# Patient Record
Sex: MALE | Race: WHITE | Employment: FULL TIME | URBAN - METROPOLITAN AREA
[De-identification: names, ages, dates, MRNs, and addresses within clinical notes are randomized per-mention and may not be internally consistent; named-entity substitution may affect disease eponyms.]

---

## 2017-04-21 ENCOUNTER — OFFICE VISIT (OUTPATIENT)
Dept: FAMILY MEDICINE | Facility: CLINIC | Age: 37
End: 2017-04-21

## 2017-04-21 VITALS
DIASTOLIC BLOOD PRESSURE: 70 MMHG | HEART RATE: 51 BPM | SYSTOLIC BLOOD PRESSURE: 118 MMHG | BODY MASS INDEX: 27.99 KG/M2 | OXYGEN SATURATION: 98 % | HEIGHT: 69 IN | WEIGHT: 189 LBS | TEMPERATURE: 97.8 F

## 2017-04-21 DIAGNOSIS — Z00.00 ROUTINE GENERAL MEDICAL EXAMINATION AT A HEALTH CARE FACILITY: Primary | ICD-10-CM

## 2017-04-21 DIAGNOSIS — Z23 NEED FOR TDAP VACCINATION: ICD-10-CM

## 2017-04-21 DIAGNOSIS — Z13.220 SCREENING FOR HYPERLIPIDEMIA: ICD-10-CM

## 2017-04-21 DIAGNOSIS — Z13.1 SCREENING FOR DIABETES MELLITUS: ICD-10-CM

## 2017-04-21 PROCEDURE — 99385 PREV VISIT NEW AGE 18-39: CPT | Mod: 25 | Performed by: FAMILY MEDICINE

## 2017-04-21 PROCEDURE — 90715 TDAP VACCINE 7 YRS/> IM: CPT | Performed by: FAMILY MEDICINE

## 2017-04-21 PROCEDURE — 82947 ASSAY GLUCOSE BLOOD QUANT: CPT | Mod: 90 | Performed by: FAMILY MEDICINE

## 2017-04-21 PROCEDURE — 80061 LIPID PANEL: CPT | Mod: 90 | Performed by: FAMILY MEDICINE

## 2017-04-21 PROCEDURE — 36415 COLL VENOUS BLD VENIPUNCTURE: CPT | Performed by: FAMILY MEDICINE

## 2017-04-21 RX ORDER — MULTIVIT WITH MINERALS/LUTEIN
1 TABLET ORAL DAILY
Qty: 30 TABLET | COMMUNITY
Start: 2017-04-21 | End: 2019-08-07

## 2017-04-21 NOTE — PROGRESS NOTES
SUBJECTIVE:     CC: Calvin Lopez is an 36 year old male who presents for preventative health visit.     Healthy Habits:    Do you get at least three servings of calcium containing foods daily (dairy, green leafy vegetables, etc.)? yes    Amount of exercise or daily activities, outside of work: 5 day(s) per week    Problems taking medications regularly not applicable    Medication side effects: No    Have you had an eye exam in the past two years? yes    Do you see a dentist twice per year? no    Do you have sleep apnea, excessive snoring or daytime drowsiness?no        NO CONCERNS    Today's PHQ-2 Score: 0  PHQ-2 ( 1999 Pfizer) 4/21/2017   Q1: Little interest or pleasure in doing things 0   Q2: Feeling down, depressed or hopeless 0   PHQ-2 Score 0       Abuse: Current or Past(Physical, Sexual or Emotional)- No  Do you feel safe in your environment - Yes    Social History   Substance Use Topics     Smoking status: Never Smoker     Smokeless tobacco: Not on file     Alcohol use 3.0 oz/week     5 Standard drinks or equivalent per week     The patient does not drink >3 drinks per day nor >7 drinks per week.        ROS:  C: NEGATIVE for fever, chills, change in weight  I: NEGATIVE for worrisome rashes, moles or lesions  E: NEGATIVE for vision changes or irritation  ENT: NEGATIVE for ear, mouth and throat problems  R: NEGATIVE for significant cough or SOB  CV: NEGATIVE for chest pain, palpitations or peripheral edema  GI: NEGATIVE for nausea, abdominal pain, heartburn, or change in bowel habits   male: negative for dysuria, hematuria, decreased urinary stream, erectile dysfunction, urethral discharge  M: NEGATIVE for significant arthralgias or myalgia  N: NEGATIVE for weakness, dizziness or paresthesias  P: NEGATIVE for changes in mood or affect    There is no problem list on file for this patient.    Past Surgical History:   Procedure Laterality Date     TONSILLECTOMY & ADENOIDECTOMY         Social History  "  Substance Use Topics     Smoking status: Never Smoker     Smokeless tobacco: Not on file     Alcohol use 3.0 oz/week     5 Standard drinks or equivalent per week     Family History   Problem Relation Age of Onset     Hyperlipidemia Father          Current Outpatient Prescriptions   Medication Sig Dispense Refill     Multiple Vitamins-Minerals (CENTRUM SILVER) per tablet Take 1 tablet by mouth daily 30 tablet      NO ACTIVE MEDICATIONS .       OBJECTIVE:     /70  Pulse 51  Temp 97.8  F (36.6  C)  Ht 1.759 m (5' 9.25\")  Wt 85.7 kg (189 lb)  SpO2 98%  BMI 27.71 kg/m2  EXAM:  GENERAL: healthy, alert and no distress  EYES: Eyes grossly normal to inspection, PERRL and conjunctivae and sclerae normal  HENT: ear canals and TM's normal, nose and mouth without ulcers or lesions  NECK: no adenopathy, no asymmetry, masses, or scars and thyroid normal to palpation  RESP: lungs clear to auscultation - no rales, rhonchi or wheezes  CV: regular rate and rhythm, normal S1 S2, no S3 or S4, no murmur, click or rub, no peripheral edema and peripheral pulses strong  ABDOMEN: soft, nontender, no hepatosplenomegaly, no masses and bowel sounds normal  MS: no gross musculoskeletal defects noted, no edema  SKIN: no suspicious lesions or rashes  NEURO: Normal strength and tone, mentation intact and speech normal  PSYCH: mentation appears normal, affect normal/bright    ASSESSMENT/PLAN:         ICD-10-CM    1. Routine general medical examination at a health care facility Z00.00    2. Screening for diabetes mellitus Z13.1 Glucose  Serum ([facility])   3. Screening for hyperlipidemia Z13.220 Lipid Panel ([facility])   4. Need for Tdap vaccination Z23 TDAP VACCINE ([facility])       COUNSELING:  Reviewed preventive health counseling, as reflected in patient instructions       Regular exercise       Healthy diet/nutrition       reports that he has never smoked. He does not have any smokeless tobacco history on file.    Estimated body mass " "index is 27.71 kg/(m^2) as calculated from the following:    Height as of this encounter: 1.759 m (5' 9.25\").    Weight as of this encounter: 85.7 kg (189 lb).       Counseling Resources:  ATP IV Guidelines  Pooled Cohorts Equation Calculator  FRAX Risk Assessment  ICSI Preventive Guidelines  Dietary Guidelines for Americans, 2010  USDA's MyPlate  ASA Prophylaxis  Lung CA Screening    Shady Yoon MD  [facility]  "

## 2017-04-21 NOTE — MR AVS SNAPSHOT
After Visit Summary   4/21/2017    Calvin Lopez    MRN: [id number]           Patient Information     Date Of Birth          1980        Visit Information        Provider Department      4/21/2017 7:45 AM Shady Yoon MD [facility]        Today's Diagnoses     Routine general medical examination at a health care facility    -  1    Screening for diabetes mellitus        Screening for hyperlipidemia        Need for Tdap vaccination          Care Instructions      Preventive Health Recommendations  Male Ages 26 - 39    Yearly exam:             See your health care provider every year in order to  o   Review health changes.   o   Discuss preventive care.    o   Review your medicines if your doctor has prescribed any.    You should be tested each year for STDs (sexually transmitted diseases), if you re at risk.     After age 35, talk to your provider about cholesterol testing. If you are at risk for heart disease, have your cholesterol tested at least every 5 years.     If you are at risk for diabetes, you should have a diabetes test (fasting glucose).  Shots: Get a flu shot each year. Get a tetanus shot every 10 years.     Nutrition:    Eat at least 5 servings of fruits and vegetables daily.     Eat whole-grain bread, whole-wheat pasta and brown rice instead of white grains and rice.     Talk to your provider about Calcium and Vitamin D.     Lifestyle    Exercise for at least 150 minutes a week (30 minutes a day, 5 days a week). This will help you control your weight and prevent disease.     Limit alcohol to one drink per day.     No smoking.     Wear sunscreen to prevent skin cancer.     See your dentist every six months for an exam and cleaning.           Follow-ups after your visit        Who to contact     If you have questions or need follow up information about today's clinic visit or your schedule please contact [facility] directly at [phone].  Normal or  "non-critical lab and imaging results will be communicated to you by MyChart, letter or phone within 4 business days after the clinic has received the results. If you do not hear from us within 7 days, please contact the clinic through [employer] or phone. If you have a critical or abnormal lab result, we will notify you by phone as soon as possible.  Submit refill requests through "[employer] or call your pharmacy and they will forward the refill request to us. Please allow 3 business days for your refill to be completed.          Additional Information About Your Visit        "[employer] Information     "[employer] lets you send messages to your doctor, view your test results, renew your prescriptions, schedule appointments and more. To sign up, go to [email or web address] . Click on \"Log in\" on the left side of the screen, which will take you to the Welcome page. Then click on \"Sign up Now\" on the right side of the page.     You will be asked to enter the access code listed below, as well as some personal information. Please follow the directions to create your username and password.     Your access code is: [email or web address]  Expires: 2017 11:17 AM     Your access code will  in 90 days. If you need help or a new code, please call your [place] clinic or [phone].        Care EveryWhere ID     This is your Care EveryWhere ID. This could be used by other organizations to access your [place] medical records  [id number]        Your Vitals Were     Pulse Temperature Height Pulse Oximetry BMI (Body Mass Index)       51 97.8  F (36.6  C) 1.759 m (5' 9.25\") 98% 27.71 kg/m2        Blood Pressure from Last 3 Encounters:   17 118/70   11 128/68   08 100/60    Weight from Last 3 Encounters:   17 85.7 kg (189 lb)   08 86.4 kg (190 lb 6.4 oz)   07 87.6 kg (193 lb 3.2 oz)              We Performed the Following     Glucose  Serum ([facility])     Lipid Panel ([facility])     TDAP VACCINE ([facility]) "        Primary Care Provider Office Phone # Fax #    Shady Yoon [phone]       [facility] [address]  [facility] [address]        Thank you!     Thank you for choosing [facility]  for your care. Our goal is always to provide you with excellent care. Hearing back from our patients is one way we can continue to improve our services. Please take a few minutes to complete the written survey that you may receive in the mail after your visit with us. Thank you!             Your Updated Medication List - Protect others around you: Learn how to safely use, store and throw away your medicines at www.disposemymeds.org.          This list is accurate as of: 4/21/17 11:17 AM.  Always use your most recent med list.                   Brand Name Dispense Instructions for use    CENTRUM SILVER per tablet     30 tablet    Take 1 tablet by mouth daily       NO ACTIVE MEDICATIONS      .

## 2017-04-22 LAB
CHOLEST SERPL-MCNC: 166 MG/DL (ref 100–199)
GLUCOSE SERPL-MCNC: 91 MG/DL (ref 65–99)
HDLC SERPL-MCNC: 47 MG/DL
LDL/HDL RATIO: 2.1 RATIO UNITS (ref 0–3.6)
LDLC SERPL CALC-MCNC: 99 MG/DL (ref 0–99)
TRIGL SERPL-MCNC: 98 MG/DL (ref 0–149)
VLDLC SERPL CALC-MCNC: 20 MG/DL (ref 5–40)

## 2017-08-21 ENCOUNTER — OFFICE VISIT (OUTPATIENT)
Dept: FAMILY MEDICINE | Facility: CLINIC | Age: 37
End: 2017-08-21

## 2017-08-21 ENCOUNTER — TRANSFERRED RECORDS (OUTPATIENT)
Dept: FAMILY MEDICINE | Facility: CLINIC | Age: 37
End: 2017-08-21

## 2017-08-21 VITALS
TEMPERATURE: 97.7 F | SYSTOLIC BLOOD PRESSURE: 102 MMHG | HEART RATE: 72 BPM | WEIGHT: 189 LBS | DIASTOLIC BLOOD PRESSURE: 80 MMHG | HEIGHT: 69 IN | OXYGEN SATURATION: 98 % | BODY MASS INDEX: 27.99 KG/M2

## 2017-08-21 DIAGNOSIS — R10.31 ABDOMINAL PAIN, RIGHT LOWER QUADRANT: Primary | ICD-10-CM

## 2017-08-21 DIAGNOSIS — R19.7 DIARRHEA, UNSPECIFIED TYPE: ICD-10-CM

## 2017-08-21 LAB
% GRANULOCYTES: 75.2 % (ref 42.2–75.2)
HCT VFR BLD AUTO: 45.9 % (ref 39–51)
HEMOGLOBIN: 15.1 G/DL (ref 13.4–17.5)
LYMPHOCYTES NFR BLD AUTO: 18.3 % (ref 20.5–51.1)
MCH RBC QN AUTO: 30.5 PG (ref 27–31)
MCHC RBC AUTO-ENTMCNC: 32.8 G/DL (ref 33–37)
MCV RBC AUTO: 92.9 FL (ref 80–100)
MONOCYTES NFR BLD AUTO: 6.5 % (ref 1.7–9.3)
PLATELET # BLD AUTO: 216 K/UL (ref 140–450)
RBC # BLD AUTO: 4.94 X10/CMM (ref 4.2–5.9)
WBC # BLD AUTO: 7.2 X10/CMM (ref 3.8–11)

## 2017-08-21 PROCEDURE — 85025 COMPLETE CBC W/AUTO DIFF WBC: CPT | Performed by: FAMILY MEDICINE

## 2017-08-21 PROCEDURE — 87177 OVA AND PARASITES SMEARS: CPT | Mod: 90 | Performed by: FAMILY MEDICINE

## 2017-08-21 PROCEDURE — 99214 OFFICE O/P EST MOD 30 MIN: CPT | Performed by: FAMILY MEDICINE

## 2017-08-21 PROCEDURE — 87209 SMEAR COMPLEX STAIN: CPT | Mod: 90 | Performed by: FAMILY MEDICINE

## 2017-08-21 PROCEDURE — 36415 COLL VENOUS BLD VENIPUNCTURE: CPT | Performed by: FAMILY MEDICINE

## 2017-08-21 PROCEDURE — 87324 CLOSTRIDIUM AG IA: CPT | Mod: 90 | Performed by: FAMILY MEDICINE

## 2017-08-21 PROCEDURE — 87046 STOOL CULTR AEROBIC BACT EA: CPT | Mod: 90 | Performed by: FAMILY MEDICINE

## 2017-08-21 PROCEDURE — 87427 SHIGA-LIKE TOXIN AG IA: CPT | Mod: 90 | Performed by: FAMILY MEDICINE

## 2017-08-21 PROCEDURE — 87045 FECES CULTURE AEROBIC BACT: CPT | Mod: 90 | Performed by: FAMILY MEDICINE

## 2017-08-21 NOTE — PROGRESS NOTES
Diarrhea -     Onset: 2 week(s) ago    Description:   Consistency of stool: watery, explosive and getting worse  Blood in stool: no  Number of loose stools in past 24 hours: 4    History:          Ill contacts: no        Recent use of antibiotics: no                 Recent medication-new or changes(Rx or OTC): no        Recent travels: YES- but domestic and no obvious bad          Have any tests/studies been done: none    Accompanying Signs & Symptoms:   Fever: YES- 100.2  Nausea or vomiting; no  Abdominal pain: YES- crampy pain  Episodes of constipation: no  Weight loss: no  Family history of Colon Cancer: no  Therapies tried: Imodium AD with minor relief    Problem(s) Oriented visit      ROS:  General and CV completed and negative except as noted above     HISTORY:   reports that he drinks about 3.0 oz of alcohol per week    reports that he has never smoked. He does not have any smokeless tobacco history on file.    Past Medical History:   Diagnosis Date     NO ACTIVE PROBLEMS      Past Surgical History:   Procedure Laterality Date     TONSILLECTOMY & ADENOIDECTOMY         EXAM:  BP: 102/80   Pulse: 72    Temp: 97.7    Wt Readings from Last 2 Encounters:   08/21/17 85.7 kg (189 lb)   04/21/17 85.7 kg (189 lb)       BMI= Body mass index is 27.91 kg/(m^2).    EXAM:  APPEARANCE: = Relaxed and in no distress  Conj/Eyelids = noninjected and lids and lashes are without inflammation  PERRLA/Irises = Pupils Round Reactive to Light and Irisis without inflammation  Neck = No anterior or posterior adenopathy appreciated.  Thyroid = Not enlarged and no masses felt  Resp effort = Calm regular breathing  Breath Sounds = Good air movement with no rales or rhonchi in any lung fields  Heart Rate, Rythym, & sounds (no Murm)  = Regular rate and rythym with no S3, S4, or murmer appreciated.  Carotid Art's = Pulses full and equal and no bruits appreciated  Abdomen: soft, nontender, no masses, & bowel sounds = RLQ pain with deep  "palpation question of rebound  Liver/Spleen = Normal span and no splenomegaly noted  Digits and Nails = FROM in all finger joints, no nail dystrophy  Ext (edema) = No pretibial edema noted or elsewhere  Musculsktl =  Strength and ROM of major joints are within normal limits  SKIN = absent significant rashes or lesions   Recent/Remote Memory = Alert and Oriented x 3  Mood/Affect = Cooperative and interested      Assessment/Plan:  Calvin was seen today for diarrhea and fever.    Diagnoses and all orders for this visit:    Abdominal pain, right lower quadrant  -     CBC with Diff/Plt ([facility])  -     Referral to [facility]    Diarrhea, unspecified type  -     C difficile Toxins A+B  EIA ([facility]); Future  -     Ova + Parasite Exam ([facility]); Future  -     Stool Culture ([facility]); Future        COUNSELING:   reports that he has never smoked. He does not have any smokeless tobacco history on file.    Estimated body mass index is 27.91 kg/(m^2) as calculated from the following:    Height as of this encounter: 1.753 m (5' 9\").    Weight as of this encounter: 85.7 kg (189 lb).         Appropriate preventive services were discussed with this patient, including applicable screening as appropriate for cardiovascular disease, diabetes, osteopenia/osteoporosis, and glaucoma.  As appropriate for age/gender, discussed screening for colorectal cancer, prostate cancer, breast cancer, and cervical cancer. Checklist reviewing preventive services available has been given to the patient.    Reviewed patients plan of care and provided an AVS. The Basic Care Plan (routine screening as documented in Health Maintenance) for Calvin meets the Care Plan requirement. This Care Plan has been established and reviewed with the  Patient.      The following health maintenance items are reviewed in Epic and correct as of today:Health Maintenance   Topic Date Due     INFLUENZA VACCINE (SYSTEM ASSIGNED)  09/01/2017     LIPID SCREEN Q5 YR MALE (SYSTEM " ASSIGNED)  04/21/2022     TETANUS IMMUNIZATION (SYSTEM ASSIGNED)  04/21/2027       Dominic Mondragon  [facility]  For any issues my office # is [phone]

## 2017-08-21 NOTE — MR AVS SNAPSHOT
After Visit Summary   8/21/2017    Calvin Lopez    MRN: [id number]           Patient Information     Date Of Birth          1980        Visit Information        Provider Department      8/21/2017 10:45 AM Dominic Mondragon MD [facility]        Today's Diagnoses     Abdominal pain, right lower quadrant    -  1    Diarrhea, unspecified type           Follow-ups after your visit        Additional Services     Referral to [facility]       Referral to [facility]  Phone: [phone]  Fax: [phone]  Reason for referral: CT of abd/pelvis  rlq pain                  Future tests that were ordered for you today     Open Future Orders        Priority Expected Expires Ordered    C difficile Toxins A+B  EIA ([facility]) Routine  9/21/2017 8/21/2017    Ova + Parasite Exam ([facility]) Routine  9/21/2017 8/21/2017    Stool Culture ([facility]) Routine  9/21/2017 8/21/2017            Who to contact     If you have questions or need follow up information about today's clinic visit or your schedule please contact [facility] directly at [phone].  Normal or non-critical lab and imaging results will be communicated to you by [employer], letter or phone within 4 business days after the clinic has received the results. If you do not hear from us within 7 days, please contact the clinic through [employer] or phone. If you have a critical or abnormal lab result, we will notify you by phone as soon as possible.  Submit refill requests through [employer] or call your pharmacy and they will forward the refill request to us. Please allow 3 business days for your refill to be completed.          Additional Information About Your Visit        [employer] Information     [employer] gives you secure access to your electronic health record. If you see a primary care provider, you can also send messages to your care team and make appointments. If you have questions, please call your primary care clinic.   "If you do not have a primary care provider, please call [phone] and they will assist you.        Care EveryWhere ID     This is your Care EveryWhere ID. This could be used by other organizations to access your [place] medical records  [id number]        Your Vitals Were     Pulse Temperature Height Pulse Oximetry BMI (Body Mass Index)       72 97.7  F (36.5  C) 1.753 m (5' 9\") 98% 27.91 kg/m2        Blood Pressure from Last 3 Encounters:   08/21/17 102/80   04/21/17 118/70   01/26/11 128/68    Weight from Last 3 Encounters:   08/21/17 85.7 kg (189 lb)   04/21/17 85.7 kg (189 lb)   03/13/08 86.4 kg (190 lb 6.4 oz)              We Performed the Following     CBC with Diff/Plt ([facility])     Referral to [facility]        Primary Care Provider Office Phone # Fax #    Shady Yoon [phone] [address] [facility] [address]        Equal Access to Services     [facility]: Hadii aad ku hadasho Soomaali, waaxda luqadaha, qaybta kaalmada cliff, deborah white . So [facility] [phone].    ATENCIÓN: Si habla español, tiene a quintanilla disposición servicios gratuitos de asistencia lingüística. [facility] [phone].    We comply with applicable federal civil rights laws and [place] laws. We do not discriminate on the basis of race, color, national origin, age, disability sex, sexual orientation or gender identity.            Thank you!     Thank you for choosing [facility]  for your care. Our goal is always to provide you with excellent care. Hearing back from our patients is one way we can continue to improve our services. Please take a few minutes to complete the written survey that you may receive in the mail after your visit with us. Thank you!             Your Updated Medication List - Protect others around you: Learn how to safely use, store and throw away your medicines at www.disposemymeds.org.          This list is accurate as of: " 8/21/17 11:46 AM.  Always use your most recent med list.                   Brand Name Dispense Instructions for use Diagnosis    CENTRUM SILVER per tablet     30 tablet    Take 1 tablet by mouth daily        NO ACTIVE MEDICATIONS      .

## 2017-08-22 LAB — CLOSTRIDIUM DIFFICILE TOXIN A AND B EIA-QUEST: NEGATIVE

## 2017-08-27 LAB
CAMPYLOBACTER CULTURE: NORMAL
E. COLI SHIGA TOXIN EIA STOOL: NEGATIVE
Lab: NORMAL
OVA + PARASITE EXAM: NORMAL
SALMONELLA/SHIGELLA SCREEN: NORMAL

## 2017-09-15 ENCOUNTER — MEDICAL CORRESPONDENCE (OUTPATIENT)
Dept: FAMILY MEDICINE | Facility: CLINIC | Age: 37
End: 2017-09-15

## 2019-08-07 ENCOUNTER — HOSPITAL ENCOUNTER (EMERGENCY)
Facility: CLINIC | Age: 39
Discharge: HOME | End: 2019-08-07
Admitting: PHYSICIAN ASSISTANT
Payer: COMMERCIAL

## 2019-08-07 VITALS
RESPIRATION RATE: 18 BRPM | OXYGEN SATURATION: 99 % | HEART RATE: 79 BPM | WEIGHT: 188 LBS | TEMPERATURE: 98.8 F | SYSTOLIC BLOOD PRESSURE: 161 MMHG | BODY MASS INDEX: 27.85 KG/M2 | HEIGHT: 69 IN | DIASTOLIC BLOOD PRESSURE: 92 MMHG

## 2019-08-07 DIAGNOSIS — J01.00 ACUTE NON-RECURRENT MAXILLARY SINUSITIS: ICD-10-CM

## 2019-08-07 PROCEDURE — 99282 EMERGENCY DEPT VISIT SF MDM: CPT

## 2019-08-07 RX ORDER — AZITHROMYCIN 250 MG/1
TABLET, FILM COATED ORAL
COMMUNITY
Start: 2019-08-05 | End: 2019-08-11

## 2019-08-07 RX ORDER — DOXYCYCLINE 100 MG/1
100 CAPSULE ORAL 2 TIMES DAILY
Qty: 14 CAPSULE | Refills: 0 | Status: ON HOLD | OUTPATIENT
Start: 2019-08-07 | End: 2019-08-14

## 2019-08-07 ASSESSMENT — MIFFLIN-ST. JEOR: SCORE: 1758.14

## 2019-08-07 ASSESSMENT — ENCOUNTER SYMPTOMS
NAUSEA: 1
SINUS PRESSURE: 1
SORE THROAT: 1
VOMITING: 0
FEVER: 1
HEADACHES: 1
COUGH: 0
ABDOMINAL PAIN: 0

## 2019-08-07 NOTE — ED PROVIDER NOTES
"  History     Chief Complaint:  Sinus pressure and fever    HPI   Calvin Lopez is a 38 year old male with a history of frequent sinus infections, who presents with sinus pressure for 1 week and fever for the past 5 days. The patient reports a highest measured fever of 103.6 F five days ago. To note, the patient went to Urgent care 2 days ago where he was prescribed Zithromax. He has currently taken 3 days worth of doses, with minimal relief. He endorses ear pain, sinus pressure, sore throat, nausea, and head pressure. He describes that upon onset of fever, he has had a fever of around 102 F each day, usually at night. He notes using a neti pot, saline spray, and sudafed as well. He reports recently being around kids 5 days ago and that his daughter has also complained of a sore throat 1 day ago. He notes that he gets sinus infections 2-3x yearly. He denies cough, abdominal pain, or vomiting.     Allergies:  Penicillins      Medications:    Zithromax     Past Medical History:    Sinus infection     Past Surgical History:    T&A    Family History:    Hyperlipidemia     Social History:  Smoking status: no  Alcohol use: yes  Drug use: no  PCP: [facility]  Marital Status:        Review of Systems   Constitutional: Positive for fever.   HENT: Positive for congestion, ear pain, sinus pressure and sore throat.    Respiratory: Negative for cough.    Gastrointestinal: Positive for nausea. Negative for abdominal pain and vomiting.   Neurological: Positive for headaches (Head pressure).   All other systems reviewed and are negative.    Physical Exam     Patient Vitals for the past 24 hrs:   BP Temp Temp src Pulse Resp SpO2 Height Weight   08/07/19 1508 (!) 161/92 98.8  F (37.1  C) Oral 79 18 99 % 1.753 m (5' 9\") 85.3 kg (188 lb)      Physical Exam  General: Alert and interactive. Appears well. Cooperative and pleasant.   Eyes: The pupils are equal and round. EOMs intact. No scleral icterus.  ENT: No " abnormalities to the external nose or ears. Mucous membranes moist. Posterior oropharynx is non-erythematous. Tenderness to percussion of the maxillary and frontal sinuses, left greater than right. TMs clear bilaterally.    Neck: Trachea is in the midline. No nuchal rigidity.     CV: Regular rate and rhythm. S1 and S2 normal without murmur, click, gallop or rub.   Resp: Breath sounds are clear bilaterally, without rhonchi, wheezes, rales. Non-labored, no retractions or accessory muscle use.     GI: Abdomen is soft without distension. No tenderness to palpation. No peritoneal signs.    MS: Moving all extremities well. Good muscle tone.   Skin: Warm and dry. No rash or lesions noted.  Neuro: Alert and oriented x 3. No focal neurologic deficits. Good strength and sensation in upper and lower extremities.    Psych: Awake. Alert.  Normal affect. Appropriate interactions.  Lymph: No anterior or posterior cervical lymphadenopathy noted.    Emergency Department Course     Emergency Department Course:  1519: Nursing notes and vitals reviewed. I performed an exam of the patient as documented above.     Findings and plan explained to the Patient. Patient discharged home with instructions regarding supportive care, medications, and reasons to return. The importance of close follow-up was reviewed. The patient was prescribed Vibramycin.     I personally answered all related questions prior to discharge.     Impression & Plan    Medical Decision Making:  Calvin Lopez is a 38 year old male who presents for evaluation of facial pain and congestion. Signs and symptoms are consistent with sinusitis. He gets these often and would like referral to ENT for consultation. He is on Azithromycin, seeing no improvement, likely because it is the wrong antibiotic. He will continue Netti pot, steam showers, Mucinex/Sudafed, and will begin Doxycycline given fevers and tenderness to percussion of maxillary sinuses.  Doubt fungal sinusitis,  meningitis, encephalitis, cavernous sinus thrombosis, ocular pathology, intracerebral bleed, serious bacterial infection otherwise. ENT information provided. Stable for discharge at this time.     Diagnosis:    ICD-10-CM    1. Acute non-recurrent maxillary sinusitis J01.00      Disposition:  discharged to home    Discharge Medications:     Medication List      Started    doxycycline hyclate 100 MG capsule  Commonly known as:  VIBRAMYCIN  100 mg, Oral, 2 TIMES DAILY            IKymberly, am serving as a scribe at 3:19 PM on 8/7/2019 to document services personally performed by Danni Kraus PA-C based on my observations and the provider's statements to me.       Kymberly Hernandez  8/7/2019    EMERGENCY DEPARTMENT       Danni Kraus PA-C  08/07/19 1800

## 2019-08-07 NOTE — ED AVS SNAPSHOT
Emergency Department  [address] [place] [address]  Phone:  [phone]  Fax:  [phone]                                    Calvin Lopez   MRN: [id number]    Department:   Emergency Department   Date of Visit:  8/7/2019           After Visit Summary Signature Page    I have received my discharge instructions, and my questions have been answered. I have discussed any challenges I see with this plan with the nurse or doctor.    ..........................................................................................................................................  Patient/Patient Representative Signature      ..........................................................................................................................................  Patient Representative Print Name and Relationship to Patient    ..................................................               ................................................  Date                                   Time    ..........................................................................................................................................  Reviewed by Signature/Title    ...................................................              ..............................................  Date                                               Time          22EPIC Rev 08/18

## 2019-08-07 NOTE — DISCHARGE INSTRUCTIONS
Ear nose and throat specialty care's office is all over the [facility] area.  If you call their general office that should be able to connect you with an open appointment in the next week.\  Discharge Instructions  Sinus Infection    You have acute sinusitis, or an infection of the sinuses. The sinuses are the hollow areas within the facial bones that are connected to the nasal opening. The most common cause of acute sinusitis is a virus infection associated with the common cold. Bacterial sinusitis occurs much less commonly, usually as a complication of viral sinusitis. Experts say that most sinusitis is caused by a virus within the first 7-10 days of illness. Antibiotics do nothing to help with virus infections, so most people do not need antibiotics for acute sinusitis.     Generally, every Emergency Department visit should have a follow-up clinic visit with either a primary or a specialty clinic/provider. Please follow-up as instructed by your emergency provider today.    Return to the Emergency Department if:  Your vision changes.  You are confused or have difficulty thinking clearly.  You have swelling around your eye.  You develop a severe headache or neck stiffness.  Your symptoms get worse and you are unable to see your primary provider.      Treatment:  Pain relief -- Non-prescription pain medications, such as Tylenol  (acetaminophen) or Motrin  or Advil  (ibuprofen) are recommended for pain.  Do not use a medicine that you are allergic to, or if your provider has told you not to use it.     Nasal irrigation -- Flushing the nose and sinuses with a saline solution several times per day can help to decrease pain caused by congestion.  Nasal decongestants -- Nasal decongestant sprays, including Afrin  (oxymetazoline) and Jonathan-Synephrine  (phenylephrine) can be used to temporarily treat congestion. However, these sprays should not be used for more than two to three days due to the risk of rebound congestion (when  the nose is congested constantly unless the medication is used repeatedly).  Nasal glucocorticoids -- These are prescription steroids delivered by a nasal spray that can help to reduce swelling inside the nose, usually within two to three days. These drugs have few side effects and dramatically relieve symptoms in most people.  If you use these in conjunction with Afrin  you will need to use at least 15 minutes prior to the nasal decongestant.    Antibiotic? -- Rarely antibiotics are used along with the above treatments.    If you were given a prescription for medicine here today, be sure to read all of the information (including the package insert) that comes with your prescription.  This will include important information about the medicine, its side effects, and any warnings that you need to know about.  The pharmacist who fills the prescription can provide more information and answer questions you may have about the medicine.  If you have questions or concerns that the pharmacist cannot address, please call or return to the Emergency Department.   Remember that you can always come back to the Emergency Department if you are not able to see your regular provider in the amount of time listed above, if you get any new symptoms, or if there is anything that worries you.

## 2019-08-11 ENCOUNTER — HOSPITAL ENCOUNTER (INPATIENT)
Facility: CLINIC | Age: 39
LOS: 3 days | Discharge: HOME | DRG: 690 | End: 2019-08-14
Attending: EMERGENCY MEDICINE | Admitting: INTERNAL MEDICINE
Payer: COMMERCIAL

## 2019-08-11 ENCOUNTER — APPOINTMENT (OUTPATIENT)
Dept: CT IMAGING | Facility: CLINIC | Age: 39
DRG: 690 | End: 2019-08-11
Attending: EMERGENCY MEDICINE
Payer: COMMERCIAL

## 2019-08-11 ENCOUNTER — APPOINTMENT (OUTPATIENT)
Dept: ULTRASOUND IMAGING | Facility: CLINIC | Age: 39
DRG: 690 | End: 2019-08-11
Attending: EMERGENCY MEDICINE
Payer: COMMERCIAL

## 2019-08-11 DIAGNOSIS — N10 PYELONEPHRITIS, ACUTE: Primary | ICD-10-CM

## 2019-08-11 DIAGNOSIS — J32.9 SINUSITIS, UNSPECIFIED CHRONICITY, UNSPECIFIED LOCATION: ICD-10-CM

## 2019-08-11 PROBLEM — N12 PYELONEPHRITIS: Status: ACTIVE | Noted: 2019-08-11

## 2019-08-11 LAB
ALBUMIN SERPL-MCNC: 3.1 G/DL (ref 3.4–5)
ALBUMIN UR-MCNC: 10 MG/DL
ALP SERPL-CCNC: 58 U/L (ref 40–150)
ALT SERPL W P-5'-P-CCNC: 23 U/L (ref 0–70)
ANION GAP SERPL CALCULATED.3IONS-SCNC: 7 MMOL/L (ref 3–14)
APPEARANCE UR: ABNORMAL
AST SERPL W P-5'-P-CCNC: 15 U/L (ref 0–45)
BACTERIA #/AREA URNS HPF: ABNORMAL /HPF
BASOPHILS # BLD AUTO: 0 10E9/L (ref 0–0.2)
BASOPHILS NFR BLD AUTO: 0.2 %
BILIRUB SERPL-MCNC: 0.4 MG/DL (ref 0.2–1.3)
BILIRUB UR QL STRIP: NEGATIVE
BUN SERPL-MCNC: 13 MG/DL (ref 7–30)
CALCIUM SERPL-MCNC: 8.7 MG/DL (ref 8.5–10.1)
CHLORIDE SERPL-SCNC: 103 MMOL/L (ref 94–109)
CO2 BLDCOV-SCNC: 25 MMOL/L (ref 21–28)
CO2 SERPL-SCNC: 26 MMOL/L (ref 20–32)
COLOR UR AUTO: YELLOW
CREAT SERPL-MCNC: 0.92 MG/DL (ref 0.66–1.25)
DIFFERENTIAL METHOD BLD: ABNORMAL
EOSINOPHIL # BLD AUTO: 0 10E9/L (ref 0–0.7)
EOSINOPHIL NFR BLD AUTO: 0.3 %
ERYTHROCYTE [DISTWIDTH] IN BLOOD BY AUTOMATED COUNT: 11.8 % (ref 10–15)
GFR SERPL CREATININE-BSD FRML MDRD: >90 ML/MIN/{1.73_M2}
GLUCOSE SERPL-MCNC: 130 MG/DL (ref 70–99)
GLUCOSE UR STRIP-MCNC: NEGATIVE MG/DL
HCT VFR BLD AUTO: 40.6 % (ref 40–53)
HGB BLD-MCNC: 14.1 G/DL (ref 13.3–17.7)
HGB UR QL STRIP: ABNORMAL
IMM GRANULOCYTES # BLD: 0.1 10E9/L (ref 0–0.4)
IMM GRANULOCYTES NFR BLD: 1 %
KETONES UR STRIP-MCNC: NEGATIVE MG/DL
LACTATE BLD-SCNC: 0.9 MMOL/L (ref 0.7–2)
LACTATE BLD-SCNC: 1.4 MMOL/L (ref 0.7–2)
LACTATE BLD-SCNC: 2 MMOL/L (ref 0.7–2.1)
LEUKOCYTE ESTERASE UR QL STRIP: ABNORMAL
LIPASE SERPL-CCNC: 91 U/L (ref 73–393)
LYMPHOCYTES # BLD AUTO: 1.4 10E9/L (ref 0.8–5.3)
LYMPHOCYTES NFR BLD AUTO: 10.9 %
MCH RBC QN AUTO: 31.8 PG (ref 26.5–33)
MCHC RBC AUTO-ENTMCNC: 34.7 G/DL (ref 31.5–36.5)
MCV RBC AUTO: 92 FL (ref 78–100)
MONOCYTES # BLD AUTO: 1.3 10E9/L (ref 0–1.3)
MONOCYTES NFR BLD AUTO: 10.2 %
NEUTROPHILS # BLD AUTO: 9.7 10E9/L (ref 1.6–8.3)
NEUTROPHILS NFR BLD AUTO: 77.4 %
NITRATE UR QL: NEGATIVE
NRBC # BLD AUTO: 0 10*3/UL
NRBC BLD AUTO-RTO: 0 /100
PCO2 BLDV: 33 MM HG (ref 40–50)
PH BLDV: 7.49 PH (ref 7.32–7.43)
PH UR STRIP: 7.5 PH (ref 5–7)
PLATELET # BLD AUTO: 281 10E9/L (ref 150–450)
PO2 BLDV: 63 MM HG (ref 25–47)
POTASSIUM SERPL-SCNC: 4.2 MMOL/L (ref 3.4–5.3)
PROCALCITONIN SERPL-MCNC: 0.34 NG/ML
PROT SERPL-MCNC: 6.8 G/DL (ref 6.8–8.8)
RBC # BLD AUTO: 4.43 10E12/L (ref 4.4–5.9)
RBC #/AREA URNS AUTO: 3 /HPF (ref 0–2)
SAO2 % BLDV FROM PO2: 94 %
SODIUM SERPL-SCNC: 136 MMOL/L (ref 133–144)
SOURCE: ABNORMAL
SP GR UR STRIP: 1.01 (ref 1–1.03)
UROBILINOGEN UR STRIP-MCNC: NORMAL MG/DL (ref 0–2)
WBC # BLD AUTO: 12.5 10E9/L (ref 4–11)
WBC #/AREA URNS AUTO: 175 /HPF (ref 0–5)
WBC CLUMPS #/AREA URNS HPF: PRESENT /HPF

## 2019-08-11 PROCEDURE — 25800030 ZZH RX IP 258 OP 636: Performed by: EMERGENCY MEDICINE

## 2019-08-11 PROCEDURE — 87086 URINE CULTURE/COLONY COUNT: CPT | Performed by: EMERGENCY MEDICINE

## 2019-08-11 PROCEDURE — 25000132 ZZH RX MED GY IP 250 OP 250 PS 637: Performed by: EMERGENCY MEDICINE

## 2019-08-11 PROCEDURE — 25000128 H RX IP 250 OP 636: Performed by: EMERGENCY MEDICINE

## 2019-08-11 PROCEDURE — 87088 URINE BACTERIA CULTURE: CPT | Performed by: EMERGENCY MEDICINE

## 2019-08-11 PROCEDURE — 99285 EMERGENCY DEPT VISIT HI MDM: CPT | Mod: 25

## 2019-08-11 PROCEDURE — 84145 PROCALCITONIN (PCT): CPT | Performed by: PHYSICIAN ASSISTANT

## 2019-08-11 PROCEDURE — 96365 THER/PROPH/DIAG IV INF INIT: CPT

## 2019-08-11 PROCEDURE — G0378 HOSPITAL OBSERVATION PER HR: HCPCS

## 2019-08-11 PROCEDURE — 25000132 ZZH RX MED GY IP 250 OP 250 PS 637: Performed by: INTERNAL MEDICINE

## 2019-08-11 PROCEDURE — 12000000 ZZH R&B MED SURG/OB

## 2019-08-11 PROCEDURE — 76705 ECHO EXAM OF ABDOMEN: CPT

## 2019-08-11 PROCEDURE — 74176 CT ABD & PELVIS W/O CONTRAST: CPT

## 2019-08-11 PROCEDURE — 36415 COLL VENOUS BLD VENIPUNCTURE: CPT | Performed by: PHYSICIAN ASSISTANT

## 2019-08-11 PROCEDURE — 87186 SC STD MICRODIL/AGAR DIL: CPT | Performed by: EMERGENCY MEDICINE

## 2019-08-11 PROCEDURE — 80053 COMPREHEN METABOLIC PANEL: CPT | Performed by: EMERGENCY MEDICINE

## 2019-08-11 PROCEDURE — 85025 COMPLETE CBC W/AUTO DIFF WBC: CPT | Performed by: EMERGENCY MEDICINE

## 2019-08-11 PROCEDURE — 81001 URINALYSIS AUTO W/SCOPE: CPT | Performed by: EMERGENCY MEDICINE

## 2019-08-11 PROCEDURE — 99223 1ST HOSP IP/OBS HIGH 75: CPT | Mod: AI | Performed by: INTERNAL MEDICINE

## 2019-08-11 PROCEDURE — 36415 COLL VENOUS BLD VENIPUNCTURE: CPT | Performed by: INTERNAL MEDICINE

## 2019-08-11 PROCEDURE — 83690 ASSAY OF LIPASE: CPT | Performed by: EMERGENCY MEDICINE

## 2019-08-11 PROCEDURE — 83605 ASSAY OF LACTIC ACID: CPT

## 2019-08-11 PROCEDURE — 99207 ZZC CDG-CODE CATEGORY CHANGED: CPT | Performed by: INTERNAL MEDICINE

## 2019-08-11 PROCEDURE — 87040 BLOOD CULTURE FOR BACTERIA: CPT | Performed by: EMERGENCY MEDICINE

## 2019-08-11 PROCEDURE — 25000128 H RX IP 250 OP 636: Performed by: PHYSICIAN ASSISTANT

## 2019-08-11 PROCEDURE — 83605 ASSAY OF LACTIC ACID: CPT | Performed by: PHYSICIAN ASSISTANT

## 2019-08-11 PROCEDURE — 82803 BLOOD GASES ANY COMBINATION: CPT

## 2019-08-11 PROCEDURE — 83605 ASSAY OF LACTIC ACID: CPT | Performed by: INTERNAL MEDICINE

## 2019-08-11 PROCEDURE — 96361 HYDRATE IV INFUSION ADD-ON: CPT

## 2019-08-11 RX ORDER — PROCHLORPERAZINE MALEATE 10 MG
10 TABLET ORAL EVERY 6 HOURS PRN
Status: DISCONTINUED | OUTPATIENT
Start: 2019-08-11 | End: 2019-08-11

## 2019-08-11 RX ORDER — CEFTRIAXONE 1 G/1
1 INJECTION, POWDER, FOR SOLUTION INTRAMUSCULAR; INTRAVENOUS EVERY 24 HOURS
Status: DISCONTINUED | OUTPATIENT
Start: 2019-08-11 | End: 2019-08-11

## 2019-08-11 RX ORDER — NALOXONE HYDROCHLORIDE 0.4 MG/ML
.1-.4 INJECTION, SOLUTION INTRAMUSCULAR; INTRAVENOUS; SUBCUTANEOUS
Status: DISCONTINUED | OUTPATIENT
Start: 2019-08-11 | End: 2019-08-14 | Stop reason: HOSPADM

## 2019-08-11 RX ORDER — ONDANSETRON 4 MG/1
4 TABLET, ORALLY DISINTEGRATING ORAL EVERY 6 HOURS PRN
Status: DISCONTINUED | OUTPATIENT
Start: 2019-08-11 | End: 2019-08-14 | Stop reason: HOSPADM

## 2019-08-11 RX ORDER — PROCHLORPERAZINE 25 MG
25 SUPPOSITORY, RECTAL RECTAL EVERY 12 HOURS PRN
Status: DISCONTINUED | OUTPATIENT
Start: 2019-08-11 | End: 2019-08-11

## 2019-08-11 RX ORDER — ACETAMINOPHEN 650 MG/1
650 SUPPOSITORY RECTAL EVERY 4 HOURS PRN
Status: DISCONTINUED | OUTPATIENT
Start: 2019-08-11 | End: 2019-08-14 | Stop reason: HOSPADM

## 2019-08-11 RX ORDER — HYDROMORPHONE HYDROCHLORIDE 1 MG/ML
.3-.5 INJECTION, SOLUTION INTRAMUSCULAR; INTRAVENOUS; SUBCUTANEOUS EVERY 4 HOURS PRN
Status: DISCONTINUED | OUTPATIENT
Start: 2019-08-11 | End: 2019-08-14 | Stop reason: HOSPADM

## 2019-08-11 RX ORDER — MEROPENEM 1 G/1
1 INJECTION, POWDER, FOR SOLUTION INTRAVENOUS EVERY 8 HOURS
Status: DISCONTINUED | OUTPATIENT
Start: 2019-08-11 | End: 2019-08-14

## 2019-08-11 RX ORDER — AMOXICILLIN 250 MG
1 CAPSULE ORAL 2 TIMES DAILY PRN
Status: DISCONTINUED | OUTPATIENT
Start: 2019-08-11 | End: 2019-08-14 | Stop reason: HOSPADM

## 2019-08-11 RX ORDER — POLYETHYLENE GLYCOL 3350 17 G/17G
17 POWDER, FOR SOLUTION ORAL DAILY PRN
Status: DISCONTINUED | OUTPATIENT
Start: 2019-08-11 | End: 2019-08-11

## 2019-08-11 RX ORDER — AMOXICILLIN 250 MG
1 CAPSULE ORAL 2 TIMES DAILY PRN
Status: DISCONTINUED | OUTPATIENT
Start: 2019-08-11 | End: 2019-08-11

## 2019-08-11 RX ORDER — ACETAMINOPHEN 650 MG/1
650 SUPPOSITORY RECTAL EVERY 4 HOURS PRN
Status: DISCONTINUED | OUTPATIENT
Start: 2019-08-11 | End: 2019-08-11

## 2019-08-11 RX ORDER — ACETAMINOPHEN 325 MG/1
650 TABLET ORAL EVERY 4 HOURS PRN
Status: DISCONTINUED | OUTPATIENT
Start: 2019-08-11 | End: 2019-08-14 | Stop reason: HOSPADM

## 2019-08-11 RX ORDER — SODIUM CHLORIDE 9 MG/ML
1000 INJECTION, SOLUTION INTRAVENOUS CONTINUOUS
Status: DISCONTINUED | OUTPATIENT
Start: 2019-08-11 | End: 2019-08-11

## 2019-08-11 RX ORDER — IBUPROFEN 400 MG/1
400 TABLET, FILM COATED ORAL EVERY 6 HOURS PRN
Status: DISCONTINUED | OUTPATIENT
Start: 2019-08-11 | End: 2019-08-14 | Stop reason: HOSPADM

## 2019-08-11 RX ORDER — DOXYCYCLINE 100 MG/1
100 CAPSULE ORAL 2 TIMES DAILY
Status: DISCONTINUED | OUTPATIENT
Start: 2019-08-11 | End: 2019-08-12

## 2019-08-11 RX ORDER — ACETAMINOPHEN 325 MG/1
650 TABLET ORAL EVERY 4 HOURS PRN
Status: DISCONTINUED | OUTPATIENT
Start: 2019-08-11 | End: 2019-08-11

## 2019-08-11 RX ORDER — ONDANSETRON 2 MG/ML
4 INJECTION INTRAMUSCULAR; INTRAVENOUS EVERY 6 HOURS PRN
Status: DISCONTINUED | OUTPATIENT
Start: 2019-08-11 | End: 2019-08-11

## 2019-08-11 RX ORDER — SODIUM CHLORIDE 9 MG/ML
INJECTION, SOLUTION INTRAVENOUS CONTINUOUS
Status: DISCONTINUED | OUTPATIENT
Start: 2019-08-11 | End: 2019-08-14 | Stop reason: HOSPADM

## 2019-08-11 RX ORDER — HYDROMORPHONE HYDROCHLORIDE 1 MG/ML
.3-.5 INJECTION, SOLUTION INTRAMUSCULAR; INTRAVENOUS; SUBCUTANEOUS EVERY 4 HOURS PRN
Status: DISCONTINUED | OUTPATIENT
Start: 2019-08-11 | End: 2019-08-11

## 2019-08-11 RX ORDER — CEFTRIAXONE 1 G/1
1 INJECTION, POWDER, FOR SOLUTION INTRAMUSCULAR; INTRAVENOUS EVERY 24 HOURS
Status: DISCONTINUED | OUTPATIENT
Start: 2019-08-12 | End: 2019-08-11

## 2019-08-11 RX ORDER — SODIUM CHLORIDE 9 MG/ML
INJECTION, SOLUTION INTRAVENOUS CONTINUOUS
Status: DISCONTINUED | OUTPATIENT
Start: 2019-08-11 | End: 2019-08-11

## 2019-08-11 RX ORDER — NALOXONE HYDROCHLORIDE 0.4 MG/ML
.1-.4 INJECTION, SOLUTION INTRAMUSCULAR; INTRAVENOUS; SUBCUTANEOUS
Status: DISCONTINUED | OUTPATIENT
Start: 2019-08-11 | End: 2019-08-11

## 2019-08-11 RX ORDER — AMOXICILLIN 250 MG
2 CAPSULE ORAL 2 TIMES DAILY PRN
Status: DISCONTINUED | OUTPATIENT
Start: 2019-08-11 | End: 2019-08-11

## 2019-08-11 RX ORDER — POLYETHYLENE GLYCOL 3350 17 G/17G
17 POWDER, FOR SOLUTION ORAL DAILY PRN
Status: DISCONTINUED | OUTPATIENT
Start: 2019-08-11 | End: 2019-08-14 | Stop reason: HOSPADM

## 2019-08-11 RX ORDER — PROCHLORPERAZINE 25 MG
25 SUPPOSITORY, RECTAL RECTAL EVERY 12 HOURS PRN
Status: DISCONTINUED | OUTPATIENT
Start: 2019-08-11 | End: 2019-08-14 | Stop reason: HOSPADM

## 2019-08-11 RX ORDER — IBUPROFEN 600 MG/1
600 TABLET, FILM COATED ORAL ONCE
Status: COMPLETED | OUTPATIENT
Start: 2019-08-11 | End: 2019-08-11

## 2019-08-11 RX ORDER — ACETAMINOPHEN 500 MG
1000 TABLET ORAL ONCE
Status: COMPLETED | OUTPATIENT
Start: 2019-08-11 | End: 2019-08-11

## 2019-08-11 RX ORDER — ONDANSETRON 2 MG/ML
4 INJECTION INTRAMUSCULAR; INTRAVENOUS EVERY 6 HOURS PRN
Status: DISCONTINUED | OUTPATIENT
Start: 2019-08-11 | End: 2019-08-14 | Stop reason: HOSPADM

## 2019-08-11 RX ORDER — ONDANSETRON 4 MG/1
4 TABLET, ORALLY DISINTEGRATING ORAL EVERY 6 HOURS PRN
Status: DISCONTINUED | OUTPATIENT
Start: 2019-08-11 | End: 2019-08-11

## 2019-08-11 RX ORDER — PROCHLORPERAZINE MALEATE 5 MG
10 TABLET ORAL EVERY 6 HOURS PRN
Status: DISCONTINUED | OUTPATIENT
Start: 2019-08-11 | End: 2019-08-14 | Stop reason: HOSPADM

## 2019-08-11 RX ORDER — AMOXICILLIN 250 MG
2 CAPSULE ORAL 2 TIMES DAILY PRN
Status: DISCONTINUED | OUTPATIENT
Start: 2019-08-11 | End: 2019-08-14 | Stop reason: HOSPADM

## 2019-08-11 RX ORDER — CEFTRIAXONE 1 G/1
1 INJECTION, POWDER, FOR SOLUTION INTRAMUSCULAR; INTRAVENOUS ONCE
Status: COMPLETED | OUTPATIENT
Start: 2019-08-11 | End: 2019-08-11

## 2019-08-11 RX ADMIN — IBUPROFEN 600 MG: 600 TABLET ORAL at 12:48

## 2019-08-11 RX ADMIN — ACETAMINOPHEN 650 MG: 325 TABLET, FILM COATED ORAL at 16:46

## 2019-08-11 RX ADMIN — IBUPROFEN 400 MG: 400 TABLET ORAL at 19:55

## 2019-08-11 RX ADMIN — ACETAMINOPHEN 1000 MG: 500 TABLET, FILM COATED ORAL at 10:26

## 2019-08-11 RX ADMIN — SODIUM CHLORIDE 1000 ML: 9 INJECTION, SOLUTION INTRAVENOUS at 08:21

## 2019-08-11 RX ADMIN — SODIUM CHLORIDE 500 ML: 9 INJECTION, SOLUTION INTRAVENOUS at 22:47

## 2019-08-11 RX ADMIN — SODIUM CHLORIDE 1000 ML: 9 INJECTION, SOLUTION INTRAVENOUS at 09:57

## 2019-08-11 RX ADMIN — DOXYCYCLINE 100 MG: 100 CAPSULE ORAL at 19:56

## 2019-08-11 RX ADMIN — MEROPENEM 1 G: 1 INJECTION, POWDER, FOR SOLUTION INTRAVENOUS at 22:08

## 2019-08-11 RX ADMIN — CEFTRIAXONE SODIUM 1 G: 1 INJECTION, POWDER, FOR SOLUTION INTRAMUSCULAR; INTRAVENOUS at 10:26

## 2019-08-11 RX ADMIN — ACETAMINOPHEN 650 MG: 325 TABLET, FILM COATED ORAL at 22:14

## 2019-08-11 ASSESSMENT — MIFFLIN-ST. JEOR
SCORE: 1767.21
SCORE: 1783.38

## 2019-08-11 NOTE — ED PROVIDER NOTES
History     Chief Complaint:  Fever       HPI   Calvin Lopez is a 38 year old male with a history of recurrent sinusitis who presents with fever. The patient reports that eight days ago he developed sinus pressure and a fever of 103 F. He was seen at urgent Care on 8/5 for evaluation of this and was prescribed Zithromax for suspected sinus infection. He did not have improvement in his symptoms after three doses of this medication, so he went to the ED for evaluation. The patient was switched to Doxycycline at this time. The patient continues that he has been taking this medication as directed along with Advil but still has not had resolution of his symptoms. He reports that during the night last night he developed a fever of 103 F, shaking chills, and RUQ abdominal and right flank pain. His persistent fever and new abdominal pain concerned him, prompting him to come to the ED. Here, the patient reports that his abdominal pain continues. He also reports continued sinus pressure, although this has improved from initial onset, as well as generalized weakness and intermittent nonbloody diarrhea. The patient denies rash, nausea, and vomiting as well as known exposure to sick contacts or ticks. Additionally, he denies sore throat or cough.  He has slight urinary discomfort.    Of note, the patient had an abdominal CT done in 2017 for right lower abdominal pain, and this was unremarkable.       Allergies:  Penicillins      Medications:    Doxycycline    Past Medical History:    Sinus infection     Past Surgical History:    Tonsillectomy & Adenoidectomy     Family History:    The patient reports a paternal history of hyperlipidemia. The patient denies any other relevant family history.     Social History:  The patient is  and presents with his wife, Mackenzie. He reports alcohol use of five drinks per week and denies tobacco or drug use.      Review of Systems   All other systems reviewed and are  "negative.    Physical Exam   First Vitals:  Patient Vitals for the past 24 hrs:   BP Temp Temp src Pulse Heart Rate Resp SpO2 Height Weight   08/11/19 0929 127/67 100  F (37.8  C) -- 87 -- 16 -- -- --   08/11/19 0741 124/72 99.8  F (37.7  C) Oral -- 111 18 97 % 1.753 m (5' 9\") 86.2 kg (190 lb)       Physical Exam  General: Somewhat ill but nontoxic appearing male semirecumbent in room 21, wife at bedside  HENT: mucous membranes slightly dry, OP clear, TMs normal, moderate tenderness to percussion over frontal and maxillary sinuses  CV: tachycardic rate initially, regular rhythm, no LE edema  Resp: clear throughout, normal effort, no crackles or wheezing  GI: abdomen soft and mild RUQ tenderness with equivocal Boo's sign, no guarding, no palpable masses  MSK:   Thoracic spine: nontender, mild R CVAT  Lumbar spine: nontender  Pelvis stable.  : deferred  Skin: appropriately warm and dry, no erythema/ecchymosis/vesicles to back  Neuro: alert, clear speech, oriented, ambulatory  Psych: pleasant, cooperative    Emergency Department Course     Imaging:  Radiographic findings were communicated with the patient who voiced understanding of the findings.    US Abdomen Limited:  IMPRESSION:  Probable gallbladder polyps. No shadowing gallstones or bile duct dilatation. Probable nonobstructing stone left renal collecting system without hydronephrosis. Report per radiology.     CT Abdomen Pelvis w/o contrast:  IMPRESSION: 1. Mild stranding about the right renal pelvis possibly related to UTI or recently passed stone. No evidence of current urinary tract calculi or hydronephrosis. 2. Mild colonic constipation. No bowel obstruction, diverticulitis or appendicitis. Report per radiology.     Laboratory:  CBC: WBC 12.5 (high), o/w WNL (HGB 14.1, )  CMP: Glucose 130 (high), Albumin 3.1 (low), o/w WNL (Creatinine 0.92)  ISTAT gases Lactate POCT: pH venous 7.49 (high), PO2 venous 33 (low), PO2 venous 63 (high), o/w " WNL  Lipase: 91  UA: Slightly cloudy, yellow urine. Trace blood. Urine pH 7.5 (high). Protein Albumin 10. Large leukocyte esterase. WBC/ (high). RBC/HPF 3 (high). WBC clumps present. Moderate bacteria present, o/w WNL.     Blood culture: Pending x2  Urine culture: Pending     Interventions:  Normal Saline 1L IV injection x2  Tylenol 1000mg tablet PO   Rocephin 1g IV injection     Emergency Department Course:  Nursing notes and vitals reviewed. I performed an exam of the patient as documented above. Patient declines any pain medication.   IV inserted and blood drawn. The patient was placed on continuous cardiac monitoring and pulse oximetry.   8:21 AM The patient was given NLS IV, dosage as noted above.     9:36 AM US obtained.  Results as above.  Findings discussed with the patient.   9:57 AM The patient was given additional NLS IV, dosage as noted above.    10:00 AM Recheck.   10:26 AM The patient was given Tylenol PO and Rocephin IV, dosages as noted above.    11:31 AM CT scan obtained.  Results as above.  Findings discussed with the patient.   12:00 PM Discussed the case with Dr. Marte, on call for hospitalist services.   Findings and plan explained to the Patient and spouse who consents to admission. Discussed the patient with Dr. Marte, who will admit the patient for further monitoring, evaluation, and treatment.     Impression & Plan      Medical Decision Making:  Given the ultimate results of his evaluation here today, I think that the most worrisome acute process is right-sided pyelonephritis causing fevers.  Bloodstream infection is also possible given his shaking chills and blood cultures were sent prior to initiation of antibiotics.  This is now his third visit this week, and he has been on several oral antibiotics to treat possible bacterial sinusitis.  There are no neurologic findings or other features to suggest a more ominous facial or neurologic condition and I do not think he requires CNS  imaging.  However, these recent antibiotics to place him at risk for resistant organisms, and given his systemic symptoms and persistent fevers, I think that he should be hospitalized for close monitoring and IV antibiotics.  He and his wife readily agree with this plan.  He was admitted to the hospitalist service.  No signs of ureteral stone or other surgical emergency.    Diagnosis:    ICD-10-CM   1. Pyelonephritis, acute N10   2. Sinusitis, unspecified chronicity, unspecified location J32.9       Disposition:  Admitted to Dr. Marte.     IGriselda, am serving as a scribe at 7:51 AM on 8/11/2019 to document services personally performed by Luis Alberto Marcum, based on my observations and the provider's statements to me.      This record was created at least in part using electronic voice recognition software, so please excuse any typographical errors.        Luis Alberto Marcum MD  08/11/19 4974

## 2019-08-11 NOTE — PHARMACY-ADMISSION MEDICATION HISTORY
Admission medication history interview status for the 8/11/2019  admission is complete. See EPIC admission navigator for prior to admission medications     Medication history source reliability:Good    Actions taken by pharmacist (provider contacted, etc): Spoke with patient     Additional medication history information not noted on PTA med list :None    Medication reconciliation/reorder completed by provider prior to medication history? No    Time spent in this activity: 10 minutes    Prior to Admission medications    Medication Sig Last Dose Taking? Auth Provider   doxycycline hyclate (VIBRAMYCIN) 100 MG capsule Take 1 capsule (100 mg) by mouth 2 times daily for 7 days 8/11/2019 at AM Yes Danni Kraus PA-C

## 2019-08-11 NOTE — PROGRESS NOTES
PRIMARY DIAGNOSIS: PYELONEPHRITIS  OUTPATIENT/OBSERVATION GOALS TO BE MET BEFORE DISCHARGE:  1. Diagnostic test and consults (if applicable) complete: Yes    2. Vitals signs stable or return to baseline: No    3. Tolerate oral intake to maintain hydration: Yes    4. Pain status: Improved-controlled with oral pain medications.    5. Tolerating oral antibiotics or has plans for home infusion setup:  No    6. Return to near baseline physical activity: Yes    Discharge Planner Nurse   Safe discharge environment identified: Yes  Barriers to discharge: Yes       Entered by: Ngoc Hooper 08/11/2019      Please review provider order for any additional goals.   Nurse to notify provider when observation goals have been met and patient is ready for discharge.

## 2019-08-11 NOTE — H&P
Admitted:     08/11/2019      PRIMARY CARE PHYSICIAN:  [facility].      CHIEF COMPLAINT:  Fevers with abdominal pain.      HISTORY OF PRESENT ILLNESS:  Mr. Calvin Lopez is a 38-year-old gentleman with history noted below, including history of sinus infections and history of urinary tract infection, who presents today with the above acute issues.  The patient of note was diagnosed with a sinus infection with fevers up to the 103 range, for which he sought urgent care on 08/05 and was prescribed azithromycin.  Continued to have sinus pressure, fevers.  He presented again, this time to [facility] ER on 08/07, and antibiotics were changed from azithromycin to doxycycline.  Following this change, did have some improvement in his sinus symptoms.  Continued to have fevers which did improve with ibuprofen, but when the ibuprofen wore off, he would continue to be febrile and have low-grade temperatures.  A few days ago, he started developing some urgency with regard to urination.  Yesterday evening, 08/10, he developed shaking chills and right-sided pain/right flank pain.  Given his continued symptoms and fevers, he came into [facility] today for further evaluation.      The patient seen in the ER by Dr. Marcum.  He had vital signs, which showed initially temperature 98.8, but T-max of 102.7, initial heart rate 111, initial blood pressure 124/72, respiratory rate 18, O2 sats 97%.  Laboratory evaluation showed white blood cell count 12.5.  Lactic acid was normal.  BMP was fairly unremarkable.  He went on to have a right upper quadrant ultrasound which showed probable gallbladder polyps with no shadowing gallstones or bile duct dilatation.  There was noted to be probable nonobstructing stone in the left renal collecting system without hydronephrosis.  CT scan was performed, which showed mild stranding about the right renal pelvis, possibly related to urinary tract infection or recently passed stone, but no evidence  of current urinary tract calculi or hydronephrosis; bowel or colonic constipation without bowel obstruction, diverticulitis or appendicitis.  The patient was given IV fluids and a dose of ceftriaxone.  Given his issues, request for admission was made.      The patient denies any chest pain or shortness of breath.  No bloody stools.  Notes loose stools/diarrhea a few days ago, but this seems to have gotten better.  No nausea or vomiting.      PAST MEDICAL HISTORY:   1.  History of sinusitis.   2.  History of urinary tract infection a few years ago.   3. The patient denies a history of diabetes, hypertension or heart disease.     PAST SURGICAL HISTORY:  Status post tonsillectomy and adenoidectomy.      ALLERGIES:  PENICILLINS.      HOME MEDICATIONS:  Doxycycline.      SOCIAL HISTORY:  The patient does not smoke.  Drinks a few times a week.  He is .  He has 3 children.  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      FAMILY HISTORY:  Daughter with hypercalciuria. Otherwise reviewed and felt to be noncontributory.      REVIEW OF SYSTEMS:  As in HPI, otherwise 10-point systems negative.      PHYSICAL EXAMINATION:   VITAL SIGNS:  Temperature most recently was 102.7 degrees, heart rate 85, blood pressure 130/70, respiratory rate 16, O2 saturations 97% on room air.   GENERAL:  Alert, oriented, pleasant, 38-year-old male who is lying in bed.  He is conversant.   HEENT:  Pupils equal, round, reactive.  No scleral icterus or subconjunctival injection.  Oropharynx reveals no gross erythema or exudate.   NECK:  No bruits or JVD.   HEART:  Mildly tachycardic without murmurs, gallops or rubs.   LUNGS:  Grossly clear, without crackles or wheezes.   ABDOMEN:  Soft, nondistended, tender to light touch on the right side, but no rebound or guarding.  Positive bowel sounds, no femoral bruits.   EXTREMITIES:  No edema.  Palpable dorsalis pedis pulses bilaterally.    NEUROLOGIC:  Reveals no gross focal motor or sensory deficits.      LABORATORY DATA:  BMP is normal.  LFTs showed albumin of 3.1, slightly low, otherwise normal.  Lactic acid normal.  Lipase normal.  CBC:  White count 12.5, hemoglobin 14.1, platelets 281.      Urinalysis showed greater than 175 white blood cells, 3 red blood cells, moderate bacteria, white blood cell clumps present, negative nitrites, positive leukocyte esterase, trace blood.      IMAGING:  As above.      ASSESSMENT AND PLAN:    Mr. Calvin Lopez is a 38-year-old male patient with history including history of sinusitis and history of urinary tract infection, who presents with fevers with right-sided abdominal pain and found with evidence of right-sided pyelonephritis.     1. Right-sided pyelonephritis with concern for early sepsis.  The patient initially was suffering from sinusitis and was started on azithromycin on 08/05, but without improvement, and the antibiotics were changed to doxycycline on 08/07 with improvement.  However, continued to have fevers thereafter, then developed urinary urgency and then on the evening of 8/10, developed right-sided abdominal/flank pain and shaking chills.  He presented to [facility] on 08/11 and initial evaluation revealed temperature up to 102.7 with tachycardia; white blood cells were elevated but lactic acid was normal, but urinalysis was abnormal with 175 white blood cells, moderate bacteria and white blood cell clumps with positive leukocyte esterase and trace blood.  RUQ US showed probable gallbladder polyps with no shadowing gallstones or bile duct dilatation, also noted probable nonobstructing stone, left renal collecting system, without hydronephrosis (but not confirmed on CT). CT scan showed mild stranding about the right renal pelvis, possibly related to urinary tract infection or recently passed stone, without evidence of current urinary tract calculi or hydronephrosis.  The patient was started on  ceftriaxone.   - Continue ceftriaxone.  - Order IV fluids  - PRN acetaminophen for fevers.  - Monitor cultures.     2. Recent sinusitis.    He was on azithromycin , which did not help. Then, changed to doxycycline on  with improvement in sinusitis symptomatology.  - Continue doxycycline for previously prescribed course.    3. Prophylaxis.   - Pneumoboots and ambulation.      CODE STATUS:  Full code.         OSKAR DIAS JR., MD             D: 2019   T: 2019   MT: TAVO      Name:     SHAWNA ESCOBEDO   MRN:      [id number]        Account:      [id number]   :      1980        Admitted:     2019                   Document: [id number]       cc: [facility]

## 2019-08-11 NOTE — PROGRESS NOTES
PRIMARY DIAGNOSIS: PYELONEPHRITIS  OUTPATIENT/OBSERVATION GOALS TO BE MET BEFORE DISCHARGE:  1. Diagnostic test and consults (if applicable) complete: Yes    2. Vitals signs stable or return to baseline: No    3. Tolerate oral intake to maintain hydration: Yes    4. Pain status: Improved-controlled with oral pain medications.    5. Tolerating oral antibiotics or has plans for home infusion setup:  No    6. Return to near baseline physical activity: Yes    Discharge Planner Nurse   Safe discharge environment identified: Yes  Barriers to discharge: Yes       Entered by: Ngoc Hooper 08/11/2019      Please review provider order for any additional goals.   Nurse to notify provider when observation goals have been met and patient is ready for discharge.    A&Ox4. Up independently. Regular diet. Temp 102.4 upon arrival from ED, just received ibuprofen prior to admission so cold packs placed and recheck 99.9. Tylenol given prn. Can be diaphoretic and noted riggers with fever trend. Otherwise VSS on RA. C/o headache, declined interventions. IVF @ 100ml/hr. Up to BR voiding spontaneously and without difficulty. IV abx. Lactic acid drawn and 0.9.

## 2019-08-11 NOTE — ED TRIAGE NOTES
Pt states he has had a fever for the 8 days, pt reports he had a temp of 103 last night.  Pt reports sinus pressure for the last several days, and sharp RLQ pain that started last night.  Pt states he has been on abx x5 days.

## 2019-08-11 NOTE — ED NOTES
"[facility]  ED Nurse Handoff Report    ED Chief complaint: Fever (Pt states he has had a fever for the 8 days, pt reports he had a temp of 103 last night.  Pt reports sinus pressure for the last several days, and sharp RLQ pain that started last night.  Pt states he has been on abx x5 days.)      ED Diagnosis:   Final diagnoses:   Pyelonephritis, acute   Sinusitis, unspecified chronicity, unspecified location       Code Status: Full Code    Allergies:   Allergies   Allergen Reactions     Penicillins Hives       Activity level - Baseline/Home:  Independent  Activity Level - Current:   Independent    Patient's Preferred language: English   Needed?: No    Isolation: No  Infection: Not Applicable  Bariatric?: No    Vital Signs:   Vitals:    08/11/19 0741 08/11/19 0929   BP: 124/72 127/67   Pulse:  87   Resp: 18 16   Temp: 99.8  F (37.7  C) 100  F (37.8  C)   TempSrc: Oral    SpO2: 97%    Weight: 86.2 kg (190 lb)    Height: 1.753 m (5' 9\")        Cardiac Rhythm: ,        Pain level: 0-10 Pain Scale: 7(Refused pain med)    Is this patient confused?: No   Does this patient have a guardian?  No         If yes, is there guardianship documents in the Epic \"Code/ACP\" activity?  N/A         Guardian Notified?  N/A  [place] - Suicide Severity Rating Scale Completed?  Yes  If yes, what color did the patient score?  White    Patient Report: Initial Complaint: Pt has a hx of fever and not feeling well for the past 8 days. Pt has been on antibiotics and symptoms continue  Focused Assessment: C/O intermittent RLQ pain. Denies burning with urination but states after UA resulted that he has had frequency. C/O sinus pressure with no drainage. Fever on arrival.  Tests Performed: Labs, U/S, Ct  Abnormal Results:   Abnormal Labs Reviewed   CBC WITH PLATELETS DIFFERENTIAL - Abnormal; Notable for the following components:       Result Value    WBC 12.5 (*)     Absolute Neutrophil 9.7 (*)     All other components " within normal limits   COMPREHENSIVE METABOLIC PANEL - Abnormal; Notable for the following components:    Glucose 130 (*)     Albumin 3.1 (*)     All other components within normal limits   ROUTINE UA WITH MICROSCOPIC - Abnormal; Notable for the following components:    Blood Urine Trace (*)     pH Urine 7.5 (*)     Protein Albumin Urine 10 (*)     Leukocyte Esterase Urine Large (*)     WBC Urine 175 (*)     RBC Urine 3 (*)     WBC Clumps Present (*)     Bacteria Urine Moderate (*)     All other components within normal limits   ISTAT  GASES LACTATE HARSHIL POCT - Abnormal; Notable for the following components:    Ph Venous 7.49 (*)     PCO2 Venous 33 (*)     PO2 Venous 63 (*)     All other components within normal limits     Treatments provided: Rocephin, IV fluids and tylenol    Family Comments: Wife at the bedside    OBS brochure/video discussed/provided to patient/family: yes              Name of person given brochure if not patient: na              Relationship to patient: na    ED Medications:   Medications   0.9% sodium chloride BOLUS (0 mLs Intravenous Stopped 8/11/19 0955)     Followed by   sodium chloride 0.9% infusion (1,000 mLs Intravenous New Bag 8/11/19 0957)   acetaminophen (TYLENOL) tablet 1,000 mg (1,000 mg Oral Given 8/11/19 1026)   cefTRIAXone (ROCEPHIN) 1 g vial to attach to  mL bag for ADULTS or NS 50 mL bag for PEDS (1 g Intravenous New Bag 8/11/19 1026)       Drips infusing?:  No    For the majority of the shift this patient was Green.   Interventions performed were na.    Severe Sepsis OR Septic Shock Diagnosis Present: No    To be done/followed up on inpatient unit:  Antibiotics    ED NURSE PHONE NUMBER: [phone]

## 2019-08-11 NOTE — PROGRESS NOTES
RECEIVING UNIT ED HANDOFF REVIEW    ED Nurse Handoff Report was reviewed by: Ngoc Hooper on August 11, 2019 at 12:31 PM

## 2019-08-12 LAB
ANION GAP SERPL CALCULATED.3IONS-SCNC: 2 MMOL/L (ref 3–14)
BASOPHILS # BLD AUTO: 0 10E9/L (ref 0–0.2)
BASOPHILS NFR BLD AUTO: 0.2 %
BUN SERPL-MCNC: 9 MG/DL (ref 7–30)
CALCIUM SERPL-MCNC: 8.1 MG/DL (ref 8.5–10.1)
CHLORIDE SERPL-SCNC: 110 MMOL/L (ref 94–109)
CO2 SERPL-SCNC: 29 MMOL/L (ref 20–32)
CREAT SERPL-MCNC: 0.88 MG/DL (ref 0.66–1.25)
DIFFERENTIAL METHOD BLD: ABNORMAL
EOSINOPHIL # BLD AUTO: 0 10E9/L (ref 0–0.7)
EOSINOPHIL NFR BLD AUTO: 0.1 %
ERYTHROCYTE [DISTWIDTH] IN BLOOD BY AUTOMATED COUNT: 12.1 % (ref 10–15)
GFR SERPL CREATININE-BSD FRML MDRD: >90 ML/MIN/{1.73_M2}
GLUCOSE SERPL-MCNC: 104 MG/DL (ref 70–99)
HCT VFR BLD AUTO: 39.7 % (ref 40–53)
HGB BLD-MCNC: 13.4 G/DL (ref 13.3–17.7)
IMM GRANULOCYTES # BLD: 0.1 10E9/L (ref 0–0.4)
IMM GRANULOCYTES NFR BLD: 0.4 %
LACTATE BLD-SCNC: 1.1 MMOL/L (ref 0.7–2)
LYMPHOCYTES # BLD AUTO: 1.5 10E9/L (ref 0.8–5.3)
LYMPHOCYTES NFR BLD AUTO: 9.5 %
MCH RBC QN AUTO: 31.5 PG (ref 26.5–33)
MCHC RBC AUTO-ENTMCNC: 33.8 G/DL (ref 31.5–36.5)
MCV RBC AUTO: 93 FL (ref 78–100)
MONOCYTES # BLD AUTO: 1.5 10E9/L (ref 0–1.3)
MONOCYTES NFR BLD AUTO: 9.3 %
NEUTROPHILS # BLD AUTO: 13 10E9/L (ref 1.6–8.3)
NEUTROPHILS NFR BLD AUTO: 80.5 %
NRBC # BLD AUTO: 0 10*3/UL
NRBC BLD AUTO-RTO: 0 /100
PLATELET # BLD AUTO: 278 10E9/L (ref 150–450)
POTASSIUM SERPL-SCNC: 4.2 MMOL/L (ref 3.4–5.3)
PROCALCITONIN SERPL-MCNC: 0.41 NG/ML
RBC # BLD AUTO: 4.25 10E12/L (ref 4.4–5.9)
SODIUM SERPL-SCNC: 141 MMOL/L (ref 133–144)
WBC # BLD AUTO: 16.2 10E9/L (ref 4–11)

## 2019-08-12 PROCEDURE — 25000132 ZZH RX MED GY IP 250 OP 250 PS 637: Performed by: PHYSICIAN ASSISTANT

## 2019-08-12 PROCEDURE — 25000128 H RX IP 250 OP 636: Performed by: PHYSICIAN ASSISTANT

## 2019-08-12 PROCEDURE — 25800030 ZZH RX IP 258 OP 636: Performed by: PHYSICIAN ASSISTANT

## 2019-08-12 PROCEDURE — 83605 ASSAY OF LACTIC ACID: CPT | Performed by: INTERNAL MEDICINE

## 2019-08-12 PROCEDURE — 85025 COMPLETE CBC W/AUTO DIFF WBC: CPT | Performed by: INTERNAL MEDICINE

## 2019-08-12 PROCEDURE — 25000132 ZZH RX MED GY IP 250 OP 250 PS 637: Performed by: INTERNAL MEDICINE

## 2019-08-12 PROCEDURE — 99233 SBSQ HOSP IP/OBS HIGH 50: CPT | Performed by: INTERNAL MEDICINE

## 2019-08-12 PROCEDURE — 36415 COLL VENOUS BLD VENIPUNCTURE: CPT | Performed by: INTERNAL MEDICINE

## 2019-08-12 PROCEDURE — 25800030 ZZH RX IP 258 OP 636: Performed by: INTERNAL MEDICINE

## 2019-08-12 PROCEDURE — 12000000 ZZH R&B MED SURG/OB

## 2019-08-12 PROCEDURE — 84145 PROCALCITONIN (PCT): CPT | Performed by: INTERNAL MEDICINE

## 2019-08-12 PROCEDURE — 80048 BASIC METABOLIC PNL TOTAL CA: CPT | Performed by: INTERNAL MEDICINE

## 2019-08-12 RX ADMIN — MEROPENEM 1 G: 1 INJECTION, POWDER, FOR SOLUTION INTRAVENOUS at 14:02

## 2019-08-12 RX ADMIN — IBUPROFEN 400 MG: 400 TABLET ORAL at 21:26

## 2019-08-12 RX ADMIN — MEROPENEM 1 G: 1 INJECTION, POWDER, FOR SOLUTION INTRAVENOUS at 21:26

## 2019-08-12 RX ADMIN — ACETAMINOPHEN 650 MG: 325 TABLET, FILM COATED ORAL at 12:52

## 2019-08-12 RX ADMIN — DOXYCYCLINE 100 MG: 100 CAPSULE ORAL at 08:21

## 2019-08-12 RX ADMIN — ACETAMINOPHEN 650 MG: 325 TABLET, FILM COATED ORAL at 05:20

## 2019-08-12 RX ADMIN — IBUPROFEN 400 MG: 400 TABLET ORAL at 14:08

## 2019-08-12 RX ADMIN — IBUPROFEN 400 MG: 400 TABLET ORAL at 03:21

## 2019-08-12 RX ADMIN — SODIUM CHLORIDE: 9 INJECTION, SOLUTION INTRAVENOUS at 23:50

## 2019-08-12 RX ADMIN — SODIUM CHLORIDE: 9 INJECTION, SOLUTION INTRAVENOUS at 01:32

## 2019-08-12 RX ADMIN — MEROPENEM 1 G: 1 INJECTION, POWDER, FOR SOLUTION INTRAVENOUS at 05:49

## 2019-08-12 ASSESSMENT — ACTIVITIES OF DAILY LIVING (ADL)
ADLS_ACUITY_SCORE: 15
ADLS_ACUITY_SCORE: 10
ADLS_ACUITY_SCORE: 15
ADLS_ACUITY_SCORE: 15

## 2019-08-12 NOTE — PROGRESS NOTES
MD Notification    Notified Person: MD    Notified Person Name: Viji PA    Notification Date/Time: 8/11/19 2000    Notification Interaction: Phone answering service     Purpose of Notification: Temp increase 104+    Orders Received: Orders for inpatient transfer     Comments:

## 2019-08-12 NOTE — PROGRESS NOTES
Pt is A&Ox4. VSS on RA ex increased temp Tmax 104.8 and tachy at times. PRN tylenol/ibuprofen alternated and ice for relief. Pt is diaphoretic, rigors, and generally weak. Spoke with wife over phone and in person, accompanied at bedside. ELVIRA Hallman paged regarding temp increase and patient condition, decided to transfer inpatient. Orders for new antibiotics and NS bolus completed prior to transfer. Repeat lactic drawn back at 1.4. BC pending neg to date. Report given to RN on Station 66 and accompanied by NA and wife for transfer

## 2019-08-12 NOTE — PROGRESS NOTES
Paged by RN for ongoing fevers with rigors up to 104 degrees fahrenheit. Chart reviewed. Pt mildly tachycardic, remainder of vitals WNL. Lactic acid WNL. Continue with IV antibiotics as outlined in note by Dr. Marte. Flip inpatient. Tylenol and ibuprofen for fevers.     ADDENDUM 2135:   Ongoing fevers, tachycardia of 108 bpm. UA positive, CT with evidence of right stranding of renal pelvis without calculi or hydronephrosis, note U/S with calcified focus in the upper pole RIGHT KIDNEY, possibly a stone measuring 0.8 cm, nonobstructing.   -- Recheck lactic acid 1.4  -- Procal 0.34, repeat in the AM   -- 500 ml bolus, IVF to 125 ccs/hr  -- Broaden antibiotics to meropenem   -- Follow urine and blood cultures, last positive urine culture from 2012 with pansensitive citrobacter  -- Inpatient status as above, q4 hour vitals  -- Reassessed and updated pt and wife at bedside. Wife reports pt with family hx of kidney stones. Pt without prior hx of kidney stones, no current flank or abdominal pain, no hematuria.   -- Urology consulted, appreciate assistance regarding noted nonobstructing stone     CONSTITUTIONAL: Pt laying in bed, dressed in hospital garb. Appears comfortable. Cooperative with interview. Accompanied by wife at bedside  HEENT: Normocephalic, atraumatic. Negative for conjunctival redness or scleral icterus.    CARDIOVASCULAR: Regular rate, tachycardic. No murmurs, rubs, or extra heart sounds appreciated. Pulses +2/4 and regular in upper and lower extremities, bilaterally.   RESPIRATORY: No increased work of breathing.  CTA, bilat; no wheezes, rales, or rhonchi appreciated.  GASTROINTESTINAL:  Abdomen soft, non-distended. BS auscultated in all four quadrants. Negative for tenderness to percussion or light and deep palpation.  No masses or organomegaly noted.  MUSCULOSKELETAL: No CVA tenderness. Mild tenderness in lumbar region, midline. No gross deformities noted. Normal muscle tone.    HEMATOLOGIC/LYMPHATIC/IMMUNOLOGIC: Negative for lower extremity edema, bilaterally.  NEUROLOGIC: Alert and oriented to person, place, and time.  strength intact.   SKIN: Warm, dry, intact. No jaundice noted. Negative for suspicious lesions, rashes, bruising, open sores or abrasions.     Signed out case to Dany Carrasco CNP, overnight house officer.

## 2019-08-12 NOTE — PLAN OF CARE
DATE & TIME: 8/12/19 2402-8854  Cognitive Concerns/ Orientation : A&Ox4   BEHAVIOR & AGGRESSION TOOL COLOR: Green  CIWA SCORE: NA  ABNL VS/O2: VSS on RA except temp max 101.6, improved to 98.6  MOBILITY: Ind  PAIN MANAGMENT: denies pain  DIET: Regular diet, tolerating well, appetite improving  BOWEL/BLADDER: Continent  ABNL LAB/BG: WBC 16.2; LA recheck (per family request) =1.1  DRAIN/DEVICES: PIV infusing  TELEMETRY RHYTHM: NA  SKIN: WNL  TESTS/PROCEDURES: NA  D/C DAY/GOALS/PLACE: Possible discharge 2-3 days, pending improvement  OTHER IMPORTANT INFO: Continues on IV abx. Urology consulted.

## 2019-08-12 NOTE — CONSULTS
[facility]    Urology Consultation Note   [facility]    Date of Admission:  8/11/2019    Assessment & Plan   Calvin Lopez is a 38 year old male who was admitted on 8/11/2019. I was asked to see the patient for right pyelonephritis and concerns for possible renal stone on renal US, CT negative for nephrolithiasis or hydronephrosis.      Plan:   -Cont IV abx, await final cultures   -CT negative for hydronephrosis or nephrolithiasis; stranding around right kidney likely secondary to pyelo   -Request urologic follow up for 24hr urine studies given family hx of nephrolithiasis and hypercalciuria-- very concerned about this; will update follow-up paperwork with our office information for appt; possible further evaluation of recurrent infections at this visit as well     Ellie Arizmendi PA-C  [facility]   [email or web address]  Text Page (7:30am to 4:30pm).    Code Status    Full Code    Reason for Consult   Reason for consult: I was asked by Dr. Marte to evaluate this patient for pyelonephritis and possible right renal calculus on US.    Primary Care Physician   [facility]    Chief Complaint   Right flank pain, fevers    History is obtained from the patient    History of Present Illness   Calvin Lopez is a 38 year old male who presents with fevers x 1 week and right flank pain x 4 days. He was being treated for sinusitis and fevers were initially thought to be associated with this. Upon admission, UA was obtained and grossly positive for infection, blood and urine cultures are pending. He was initiated on IV rocephin and then switched to IV meropenem. Renal US was obtained shows a possible calcification in the right upper pole of the right kidney, this was further evaluated with CT which was negative for nephrolithiasis, hydronephrosis bilaterally. CT did note an area of stranding around the right kidney, consistent with pyelo. Pt noted urinary  frequency throughout the past week but otherwise denies any hematuria, cloudy appearing urine, dysuria, incontinence. Creat stable at 0.88, hb 13.4, WBC 16.2. Pt notes one occurrence of UTI in the past, in 2012, this was uncomplicated.     From admission H&P: Mr. Calvin Lopez is a 38-year-old gentleman with history noted below, including history of sinus infections and history of urinary tract infection, who presents today with the above acute issues.  The patient of note was diagnosed with a sinus infection with fevers up to the 103 range, for which he sought urgent care on 08/05 and was prescribed azithromycin.  Continued to have sinus pressure, fevers.  He presented again, this time to [facility] ER on 08/07, and antibiotics were changed from azithromycin to doxycycline.  Following this change, did have some improvement in his sinus symptoms.  Continued to have fevers which did improve with ibuprofen, but when the ibuprofen wore off, he would continue to be febrile and have low-grade temperatures.  A few days ago, he started developing some urgency with regard to urination.  Yesterday evening, 08/10, he developed shaking chills and right-sided pain/right flank pain.  Given his continued symptoms and fevers, he came into [facility] today for further evaluation.      The patient seen in the ER by Dr. Mracum.  He had vital signs, which showed initially temperature 98.8, but T-max of 102.7, initial heart rate 111, initial blood pressure 124/72, respiratory rate 18, O2 sats 97%.  Laboratory evaluation showed white blood cell count 12.5.  Lactic acid was normal.  BMP was fairly unremarkable.  He went on to have a right upper quadrant ultrasound which showed probable gallbladder polyps with no shadowing gallstones or bile duct dilatation.  There was noted to be probable nonobstructing stone in the left renal collecting system without hydronephrosis.  CT scan was performed, which showed mild stranding about the right  renal pelvis, possibly related to urinary tract infection or recently passed stone, but no evidence of current urinary tract calculi or hydronephrosis; bowel or colonic constipation without bowel obstruction, diverticulitis or appendicitis.  The patient was given IV fluids and a dose of ceftriaxone.  Given his issues, request for admission was made.      The patient denies any chest pain or shortness of breath.  No bloody stools.  Notes loose stools/diarrhea a few days ago, but this seems to have gotten better.  No nausea or vomiting.     Past Medical History   I have reviewed this patient's medical history and updated it with pertinent information if needed.   Past Medical History:   Diagnosis Date     NO ACTIVE PROBLEMS      Sinus infection        Past Surgical History   I have reviewed this patient's surgical history and updated it with pertinent information if needed.  Past Surgical History:   Procedure Laterality Date     TONSILLECTOMY & ADENOIDECTOMY         Prior to Admission Medications   Prior to Admission Medications   Prescriptions Last Dose Informant Patient Reported? Taking?   doxycycline hyclate (VIBRAMYCIN) 100 MG capsule 8/11/2019 at AM  No Yes   Sig: Take 1 capsule (100 mg) by mouth 2 times daily for 7 days      Facility-Administered Medications: None     Allergies   Allergies   Allergen Reactions     Penicillins Hives       Social History   I have reviewed this patient's social history and updated it with pertinent information if needed. Calvin WILIAN Lopez  reports that he has never smoked. He has never used smokeless tobacco. He reports that he drinks about 3.0 oz of alcohol per week. He reports that he does not use drugs.    Family History   I have reviewed this patient's family history and updated it with pertinent information if needed.   Family History   Problem Relation Age of Onset     Hyperlipidemia Father        Review of Systems   The 10 point Review of Systems is negative other than noted in  the HPI or here.     Physical Exam   Temp: 98.5  F (36.9  C) Temp src: Oral BP: 114/74 Pulse: 95 Heart Rate: 72 Resp: 16 SpO2: 95 % O2 Device: None (Room air)    Vitals:    08/11/19 0741 08/11/19 2305   Weight: 86.2 kg (190 lb) 87.8 kg (193 lb 9 oz)     Vital Signs with Ranges  Temp:  [98.5  F (36.9  C)-104.8  F (40.4  C)] 98.5  F (36.9  C)  Pulse:  [95] 95  Heart Rate:  [] 72  Resp:  [16-20] 16  BP: (112-162)/(55-81) 114/74  SpO2:  [94 %-98 %] 95 %  I/O last 3 completed shifts:  In: 3721 [P.O.:480; I.V.:2241; IV Piggyback:1000]  Out: 2805 [Urine:2805]    Constitutional: Sitting up in bed, NAD; wife at bedside   Eyes: no icterus  ENT: normocephalic   Respiratory: breathing unlabored   Cardiovascular: chest wall symmetric   GI: soft, NT, ND; no CVAT bilaterally   Genitourinary: defer  Vascular/hematologic: no le edema   Skin: well perfused   Musculoskeletal: moves all extremities   Neurologic: no focal deficits   Neuropsychiatric: A&Ox3      Medications     sodium chloride 75 mL/hr at 08/12/19 0906       meropenem  1 g Intravenous Q8H       Data   Results for orders placed or performed during the hospital encounter of 08/11/19 (from the past 24 hour(s))   Lactic acid level STAT for sepsis protocol   Result Value Ref Range    Lactate for Sepsis Protocol 0.9 0.7 - 2.0 mmol/L   Procalcitonin   Result Value Ref Range    Procalcitonin 0.34 ng/ml   Lactic acid whole blood   Result Value Ref Range    Lactic Acid 1.4 0.7 - 2.0 mmol/L   Basic metabolic panel   Result Value Ref Range    Sodium 141 133 - 144 mmol/L    Potassium 4.2 3.4 - 5.3 mmol/L    Chloride 110 (H) 94 - 109 mmol/L    Carbon Dioxide 29 20 - 32 mmol/L    Anion Gap 2 (L) 3 - 14 mmol/L    Glucose 104 (H) 70 - 99 mg/dL    Urea Nitrogen 9 7 - 30 mg/dL    Creatinine 0.88 0.66 - 1.25 mg/dL    GFR Estimate >90 >60 mL/min/[1.73_m2]    GFR Estimate If Black >90 >60 mL/min/[1.73_m2]    Calcium 8.1 (L) 8.5 - 10.1 mg/dL   CBC with platelets differential   Result  Value Ref Range    WBC 16.2 (H) 4.0 - 11.0 10e9/L    RBC Count 4.25 (L) 4.4 - 5.9 10e12/L    Hemoglobin 13.4 13.3 - 17.7 g/dL    Hematocrit 39.7 (L) 40.0 - 53.0 %    MCV 93 78 - 100 fl    MCH 31.5 26.5 - 33.0 pg    MCHC 33.8 31.5 - 36.5 g/dL    RDW 12.1 10.0 - 15.0 %    Platelet Count 278 150 - 450 10e9/L    Diff Method Automated Method     % Neutrophils 80.5 %    % Lymphocytes 9.5 %    % Monocytes 9.3 %    % Eosinophils 0.1 %    % Basophils 0.2 %    % Immature Granulocytes 0.4 %    Nucleated RBCs 0 0 /100    Absolute Neutrophil 13.0 (H) 1.6 - 8.3 10e9/L    Absolute Lymphocytes 1.5 0.8 - 5.3 10e9/L    Absolute Monocytes 1.5 (H) 0.0 - 1.3 10e9/L    Absolute Eosinophils 0.0 0.0 - 0.7 10e9/L    Absolute Basophils 0.0 0.0 - 0.2 10e9/L    Abs Immature Granulocytes 0.1 0 - 0.4 10e9/L    Absolute Nucleated RBC 0.0    Procalcitonin   Result Value Ref Range    Procalcitonin 0.41 ng/ml

## 2019-08-12 NOTE — PLAN OF CARE
DATE & TIME: transfer from obs [id number], 8/12/2019    Cognitive Concerns/ Orientation : A&Ox4   BEHAVIOR & AGGRESSION TOOL COLOR: Green  CIWA SCORE: na   ABNL VS/O2: max temp 102.9, most recent recheck 100.7 after ibuprofen/tylenol/ice. Other VSS on RA  MOBILITY: Ind  PAIN MANAGMENT: C/o generalized aches, tylenol/ibuprofen given  DIET: Reg  BOWEL/BLADDER: continent  ABNL LAB/BG: LA 1.4, Procalc 0.34, UA +, UC pending. WBC 12.5  DRAIN/DEVICES: IVF infusing at 125 ml/hr  TELEMETRY RHYTHM: na  SKIN: diaphoretic, intact  TESTS/PROCEDURES: na  D/C DAY/GOALS/PLACE: Urology consult  OTHER IMPORTANT INFO: febrile, shaky/chills and generalized body aches. Very diaphoretic at times.

## 2019-08-12 NOTE — PROGRESS NOTES
[facility]    Internal Medicine Hospitalist Progress Note  08/12/2019  I evaluated patient on the above date.    Tam Marte Jr., MD  [phone] (p)  Text Page        Assessment & Plan New actions/orders today (08/12/2019) are underlined.    Mr. Calvin Lopez is a 38-year-old male patient with history including history of sinusitis and history of urinary tract infection, who presented 8/11 with fevers with right-sided abdominal pain and found with evidence of right-sided pyelonephritis.     1. Right-sided pyelonephritis with sepsis, question related to kidney stone.  Developed urinary urgency and then on the evening of 8/10, developed right-sided abdominal/flank pain and shaking chills. He presented to [facility] on 8/11 and initial evaluation revealed temperature up to 102.7 with tachycardia; white blood cells were elevated but lactic acid was normal, but urinalysis was abnormal with 175 white blood cells, moderate bacteria and white blood cell clumps with positive leukocyte esterase and trace blood.  RUQ US 8/11 showed probable gallbladder polyps with no shadowing gallstones or bile duct dilatation, also noted probable nonobstructing stone, left renal collecting system, without hydronephrosis (but not confirmed on CT). CT scan 8/11 showed mild stranding about the right renal pelvis, possibly related to urinary tract infection or recently passed stone, without evidence of current urinary tract calculi or hydronephrosis. The patient was started on ceftriaxone 8/11. Fever to 104.8 evening 8/11. Given persistent fevers and sepsis, ceftriaxone changed to meropenem 8/11 and transitioned from observation to inpatient. Temps better and feeling much better 8/12.  * Micro: BC's 8/11 NGTD. UC 8/11 pending.  - Continue meropenem (started 8/11).  - Urology consulted, appreciate help.  - Continue IVF's.  - Continue PRN acetaminophen alternating with PRN ibuprofen.  - Monitor cultures.     2. Infectious  "encephalopathy.  Pt felt \"delirious\" with high fevers 8/11. Better 8/12.  - Continue to treat infection as above.    3. Recent sinusitis.   * The patient initially was suffering from sinusitis and was started on azithromycin on 8/5, but without improvement, and the antibiotics were changed to doxycycline on 8/7 with improvement in sinus symptoms.  * Continued on doxycycline on admit 8/11.  - Hold doxycycline (started outpatient 8/7) as pt well covered with meropenem; original duration of 7 days ordered (was to stop after 8/13).    Diet: Regular Diet Adult    Prophylaxis: PCD's, ambulation.   Duran Catheter: not present  Code Status: Full Code      Communication.  - I d/w pt's wife 8/12.    Disposition Plan   Expected discharge: 2 - 3 days, recommended to prior living arrangement once SIRS/Sepsis treated.  Entered: Tam Marte MD 08/12/2019, 7:39 AM         Interval History   Fever to 104.8 yesterday evening.  Pt felt delirious.  Transferred to  and started on meropenem.  Temps better and feeling much better.  Tolerating diet. Still with some R flank pain with movement.  Urinating OK.    -Data reviewed today: I reviewed all new labs and imaging over the last 24 hours. I personally reviewed no images or EKG's today.    Physical Exam   Heart Rate: 85, Blood pressure 135/81, pulse 95, temperature 100.7  F (38.2  C), temperature source Oral, resp. rate 18, height 1.753 m (5' 9\"), weight 87.8 kg (193 lb 9 oz), SpO2 98 %.  Vitals:    08/11/19 0741 08/11/19 2305   Weight: 86.2 kg (190 lb) 87.8 kg (193 lb 9 oz)     Vital Signs with Ranges  Temp:  [99.4  F (37.4  C)-104.8  F (40.4  C)] 100.7  F (38.2  C)  Pulse:  [87-95] 95  Heart Rate:  [] 85  Resp:  [16-20] 18  BP: (112-162)/(55-81) 135/81  SpO2:  [94 %-98 %] 98 %  Patient Vitals for the past 24 hrs:   BP Temp Temp src Pulse Heart Rate Resp SpO2 Height Weight   08/12/19 0552 -- 100.7  F (38.2  C) Oral -- -- -- -- -- --   08/12/19 0520 -- 102.9  F (39.4  C) " "Oral -- -- -- -- -- --   08/12/19 0405 135/81 100  F (37.8  C) Oral -- 85 18 98 % -- --   08/12/19 0321 -- 99.4  F (37.4  C) Oral -- -- 16 -- -- --   08/11/19 2305 112/59 99.6  F (37.6  C) Oral -- 83 18 94 % -- 87.8 kg (193 lb 9 oz)   08/11/19 2206 -- 102.7  F (39.3  C) Oral -- -- -- -- -- --   08/11/19 2127 121/61 103.2  F (39.6  C) Axillary -- 108 16 95 % -- --   08/11/19 2032 -- 104.3  F (40.2  C) Oral -- -- 20 -- -- --   08/11/19 1947 119/59 104.3  F (40.2  C) Oral -- 102 18 96 % -- --   08/11/19 1930 -- 104.8  F (40.4  C) Axillary -- -- -- -- -- --   08/11/19 1835 (!) 156/68 100.5  F (38.1  C) -- -- 93 16 95 % -- --   08/11/19 1804 (!) 162/67 101.6  F (38.7  C) Oral -- 90 16 -- -- --   08/11/19 1646 -- 99.5  F (37.5  C) Oral -- -- -- -- -- --   08/11/19 1609 -- 100.5  F (38.1  C) Oral -- -- -- -- -- --   08/11/19 1516 123/61 99.8  F (37.7  C) Oral -- 88 16 95 % -- --   08/11/19 1425 -- 99.9  F (37.7  C) Oral -- -- -- -- -- --   08/11/19 1309 128/55 102.4  F (39.1  C) Oral -- 96 16 98 % -- --   08/11/19 1240 117/72 102.7  F (39.3  C) -- 95 -- 16 -- -- --   08/11/19 0929 127/67 100  F (37.8  C) -- 87 -- 16 -- -- --   08/11/19 0741 124/72 99.8  F (37.7  C) Oral -- 111 18 97 % 1.753 m (5' 9\") 86.2 kg (190 lb)     I/O's Last 24 hours  I/O last 3 completed shifts:  In: 3721 [P.O.:480; I.V.:2241; IV Piggyback:1000]  Out: 2805 [Urine:2805]    Constitutional: Awake, alert, appropriate.  Respiratory: Diminished in bases. No crackles or wheezes.  Cardiovascular: RRR, no m/r/g.  GI: Soft, nd, tender RLQ region, no r/g.  Skin/Integumen:   Other:  Neuro - no gross focal motor/sensory deficits.      Data   Recent Labs   Lab 08/11/19  0811   WBC 12.5*   HGB 14.1   MCV 92         POTASSIUM 4.2   CHLORIDE 103   CO2 26   BUN 13   CR 0.92   ANIONGAP 7   TISH 8.7   *   ALBUMIN 3.1*   PROTTOTAL 6.8   BILITOTAL 0.4   ALKPHOS 58   ALT 23   AST 15   LIPASE 91     Recent Labs   Lab Test 08/11/19  0811 04/21/17  0834 "   * 91         Recent Results (from the past 24 hour(s))   US Abdomen Limited    Narrative    ULTRASOUND ABDOMEN LIMITED 8/11/2019 9:05 AM     HISTORY: Right upper quadrant pain, evaluate hepatic/biliary.      FINDINGS:  Liver is normal in echogenicity without focal lesions. The  gallbladder contains a few nonmobile, nonshadowing echogenic foci  possibly polyps measuring approximately 3 mm in size. No associated  gallbladder wall thickening. Common bile duct is normal in diameter.  Pancreas is normal where visualized. Examination of the right kidney  shows a calcified focus in the upper pole possibly a stone measuring  0.8 cm. No associated hydronephrosis.      Impression    IMPRESSION:  Probable gallbladder polyps. No shadowing gallstones or  bile duct dilatation. Probable nonobstructing stone left renal  collecting system without hydronephrosis.    MADI SHEA MD   CT Abdomen Pelvis w/o Contrast    Narrative    CT ABDOMEN AND PELVIS WITHOUT CONTRAST 8/11/2019 11:13 AM     HISTORY: Acute right flank pain, pyuria, fever, strong family history  history of kidney stones.     TECHNIQUE: Axial images are obtained from the lung bases to the  symphysis without oral or IV contrast.  Coronal reformatted images are  also generated.  Radiation dose for this scan was reduced using  automated exposure control, adjustment of the mA and/or kV according  to patient size, or iterative reconstruction technique.    FINDINGS:  Atelectasis is present at the lung bases. Lung bases are  otherwise clear.    Abdomen: Respiratory motion artifact is present through the abdomen.  No evidence of hydronephrosis or urinary tract calculi at this time.  No bladder calculi are appreciated. Question whether patient has  recently passed a stone as there is mild stranding about the right  renal pelvis. This could also be related to underlying UTI. Upper  abdominal organs are otherwise within normal limits allowing for the  noncontrast technique  including the liver, spleen, gallbladder,  pancreas, adrenal glands and kidneys. No enlarged abdominal lymph  nodes. The bowel is normal in caliber without obstruction,  diverticulitis or appendicitis. Possible mild colonic constipation.    Pelvis: The bladder, prostate gland and rectum are unremarkable. No  enlarged pelvic lymph nodes or free fluid. Bone window examination is  within normal limits.      Impression    IMPRESSION:  1. Mild stranding about the right renal pelvis possibly related to UTI  or recently passed stone. No evidence of current urinary tract calculi  or hydronephrosis.  2. Mild colonic constipation. No bowel obstruction, diverticulitis or  appendicitis.    MADI SHEA MD       Medications   All medications were reviewed.    sodium chloride 125 mL/hr at 08/12/19 0132       doxycycline hyclate  100 mg Oral BID     meropenem  1 g Intravenous Q8H

## 2019-08-12 NOTE — PLAN OF CARE
PRIMARY DIAGNOSIS: PYELONEPHRITIS  OUTPATIENT/OBSERVATION GOALS TO BE MET BEFORE DISCHARGE:  Diagnostic test and consults (if applicable) complete: Yes     Vitals signs stable or return to baseline: No- increased temp and HR     Tolerate oral intake to maintain hydration: Yes     Pain status: Improved-controlled with oral pain medications.     Tolerating oral antibiotics or has plans for home infusion setup:  No- continued IV abx     Return to near baseline physical activity: Yes        Discharge Planner Nurse      Safe discharge environment identified: Yes  Barriers to discharge: Yes       Entered by: Manuela Arana RN     Please review provider order for any additional goals.   Nurse to notify provider when observation goals have been met and patient is ready for discharge.

## 2019-08-12 NOTE — PLAN OF CARE
DATE & TIME: 8/12 Day  Cognitive Concerns/ Orientation : A&Ox4   BEHAVIOR & AGGRESSION TOOL COLOR: Green  CIWA SCORE: NA  ABNL VS/O2: VSS on RA except temp max 103  MOBILITY: Ind  PAIN MANAGMENT: R side/flank pain, but Tylenol and Ibuprofen given for fevers  DIET: Regular  BOWEL/BLADDER: Continent  ABNL LAB/BG: WBC 16.2  DRAIN/DEVICES: PIV NS at 75  TELEMETRY RHYTHM: NA  SKIN: WNL  TESTS/PROCEDURES: NA  D/C DAY/GOALS/PLACE: 2-3 days  OTHER IMPORTANT INFO: Started spiking fevers toward end of shift. PO Vibramycin stopped, on IV Merrem. Urology consulted, no intervention I/P, plan for pt to F/U when discharged.

## 2019-08-13 LAB
ANION GAP SERPL CALCULATED.3IONS-SCNC: 2 MMOL/L (ref 3–14)
BACTERIA SPEC CULT: ABNORMAL
BASOPHILS # BLD AUTO: 0 10E9/L (ref 0–0.2)
BASOPHILS NFR BLD AUTO: 0.2 %
BUN SERPL-MCNC: 9 MG/DL (ref 7–30)
CALCIUM SERPL-MCNC: 8.2 MG/DL (ref 8.5–10.1)
CHLORIDE SERPL-SCNC: 110 MMOL/L (ref 94–109)
CO2 SERPL-SCNC: 31 MMOL/L (ref 20–32)
CREAT SERPL-MCNC: 0.93 MG/DL (ref 0.66–1.25)
DIFFERENTIAL METHOD BLD: ABNORMAL
EOSINOPHIL # BLD AUTO: 0.1 10E9/L (ref 0–0.7)
EOSINOPHIL NFR BLD AUTO: 0.6 %
ERYTHROCYTE [DISTWIDTH] IN BLOOD BY AUTOMATED COUNT: 12.3 % (ref 10–15)
GFR SERPL CREATININE-BSD FRML MDRD: >90 ML/MIN/{1.73_M2}
GLUCOSE SERPL-MCNC: 103 MG/DL (ref 70–99)
HCT VFR BLD AUTO: 40.1 % (ref 40–53)
HGB BLD-MCNC: 13.3 G/DL (ref 13.3–17.7)
IMM GRANULOCYTES # BLD: 0.1 10E9/L (ref 0–0.4)
IMM GRANULOCYTES NFR BLD: 0.6 %
LYMPHOCYTES # BLD AUTO: 2 10E9/L (ref 0.8–5.3)
LYMPHOCYTES NFR BLD AUTO: 14.2 %
Lab: ABNORMAL
MCH RBC QN AUTO: 31.3 PG (ref 26.5–33)
MCHC RBC AUTO-ENTMCNC: 33.2 G/DL (ref 31.5–36.5)
MCV RBC AUTO: 94 FL (ref 78–100)
MONOCYTES # BLD AUTO: 1.1 10E9/L (ref 0–1.3)
MONOCYTES NFR BLD AUTO: 7.7 %
NEUTROPHILS # BLD AUTO: 10.8 10E9/L (ref 1.6–8.3)
NEUTROPHILS NFR BLD AUTO: 76.7 %
NRBC # BLD AUTO: 0 10*3/UL
NRBC BLD AUTO-RTO: 0 /100
PLATELET # BLD AUTO: 326 10E9/L (ref 150–450)
POTASSIUM SERPL-SCNC: 4.2 MMOL/L (ref 3.4–5.3)
RBC # BLD AUTO: 4.25 10E12/L (ref 4.4–5.9)
SODIUM SERPL-SCNC: 143 MMOL/L (ref 133–144)
SPECIMEN SOURCE: ABNORMAL
WBC # BLD AUTO: 14.1 10E9/L (ref 4–11)

## 2019-08-13 PROCEDURE — 80048 BASIC METABOLIC PNL TOTAL CA: CPT | Performed by: INTERNAL MEDICINE

## 2019-08-13 PROCEDURE — 36415 COLL VENOUS BLD VENIPUNCTURE: CPT | Performed by: INTERNAL MEDICINE

## 2019-08-13 PROCEDURE — 12000000 ZZH R&B MED SURG/OB

## 2019-08-13 PROCEDURE — 85025 COMPLETE CBC W/AUTO DIFF WBC: CPT | Performed by: INTERNAL MEDICINE

## 2019-08-13 PROCEDURE — 99232 SBSQ HOSP IP/OBS MODERATE 35: CPT | Performed by: HOSPITALIST

## 2019-08-13 PROCEDURE — 25800030 ZZH RX IP 258 OP 636: Performed by: INTERNAL MEDICINE

## 2019-08-13 PROCEDURE — 25000128 H RX IP 250 OP 636: Performed by: PHYSICIAN ASSISTANT

## 2019-08-13 RX ADMIN — MEROPENEM 1 G: 1 INJECTION, POWDER, FOR SOLUTION INTRAVENOUS at 21:36

## 2019-08-13 RX ADMIN — MEROPENEM 1 G: 1 INJECTION, POWDER, FOR SOLUTION INTRAVENOUS at 14:09

## 2019-08-13 RX ADMIN — MEROPENEM 1 G: 1 INJECTION, POWDER, FOR SOLUTION INTRAVENOUS at 06:51

## 2019-08-13 RX ADMIN — SODIUM CHLORIDE: 9 INJECTION, SOLUTION INTRAVENOUS at 14:09

## 2019-08-13 ASSESSMENT — ACTIVITIES OF DAILY LIVING (ADL)
ADLS_ACUITY_SCORE: 10

## 2019-08-13 NOTE — PROGRESS NOTES
[facility]    Medicine Progress Note - Hospitalist Service       Date of Admission:  8/11/2019  Assessment & Plan   Mr. Calvin Lopez is a 38-year-old male patient with history including history of sinusitis and history of urinary tract infection, who presented 8/11 with fevers with right-sided abdominal pain and found with evidence of right-sided pyelonephritis.     Right pyelonephritis with sepsis, possibly related to kidney stone  Developed urinary urgency and then on the evening of 8/10, developed right-sided abdominal/flank pain and shaking chills. He presented to [facility] on 8/11 and initial evaluation revealed temperature up to 102.7 with tachycardia; white blood cells were elevated but lactic acid was normal. Urinalysis was abnormal with 175 white blood cells, moderate bacteria and white blood cell clumps with positive leukocyte esterase and trace blood.  RUQ US 8/11 showed probable gallbladder polyps with no shadowing gallstones or bile duct dilatation, also noted probable nonobstructing stone, left renal collecting system, without hydronephrosis (but not confirmed on CT). CT scan 8/11 showed mild stranding about the right renal pelvis, possibly related to urinary tract infection or recently passed stone, without evidence of current urinary tract calculi or hydronephrosis. The patient was started on ceftriaxone 8/11. Fever to 104.8 evening 8/11. Given persistent fevers and sepsis, ceftriaxone changed to meropenem 8/11 and transitioned from observation to inpatient.  - Urine culture growing 10,000 - 50,000 cfu/ml of E.coli, sensitivities pending.  - Blood cultures negative to date.  - Fever curve improving.   - Symptoms improving.  - WBC still elevated, but trending down.  - Continue meropenem and await final culture results.  - Continue IV fluids pending better oral intake.  - Continue as needed pain medications.  - Urology consulted, note reviewed, appreciate their assistance.    Acute  "infectious encephalopathy  Pt felt \"delirious\" with high fevers 8/11.  - Resolved.  - Treat underlying infection as noted above.    Recent sinusitis  * The patient initially was suffering from sinusitis and was started on azithromycin on 8/5, but without improvement, and the antibiotics were changed to doxycycline on 8/7 with improvement in sinus symptoms.  * Continued on doxycycline on admit 8/11, discontinued 8/12/19. Sinusitis is covered by the meropenem he is receiving for the UTI as noted above.       Diet: Regular Diet Adult    DVT Prophylaxis: Pneumatic Compression Devices  Duran Catheter: not present  Code Status: Full Code      Disposition Plan   Expected discharge: 1-2 days, recommended to prior living arrangement once fever improved, sensitivities on urine culture available.  Entered: Eron Gr MD 08/13/2019, 10:35 AM       The patient's care was discussed with the Bedside Nurse and patient's wife.    Eron Gr MD  Hospitalist Service  [facility]    ______________________________________________________________________    Interval History   Calvin Lopez feels better today. Had a fever up to 103 yesterday afternoon, no fevers since then. Overall, feels like he is improving. Some mild discomfort in the RUQ of his abdomen, also improving. Denies chest pain and shortness of breath. Having 1-2 semi-loose stools per day. Denies hematuria and dysuria.    Data reviewed today: I reviewed all medications, new labs and imaging results over the last 24 hours. I personally reviewed no images or EKG's today.    Physical Exam   Vital Signs: Temp: 99.8  F (37.7  C) Temp src: Oral BP: 129/72 Pulse: 95 Heart Rate: 87 Resp: 16 SpO2: 94 % O2 Device: None (Room air)    Weight: 193 lbs 9.02 oz  Constitutional: awake, alert, cooperative, no apparent distress, and appears stated age  Respiratory: clear to auscultation bilaterally, no crackles or wheezing  Cardiovascular: regular rate and " rhythm, normal S1 and S2, no murmur noted and no edema  GI: normal bowel sounds, soft, non-distended and tenderness noted mild in RUQ  Skin: normal skin color, texture, turgor  Neurologic: awake, alert, oriented to name, place and time    Data   Recent Labs   Lab 08/13/19  0742 08/12/19  0824 08/11/19  0811   WBC 14.1* 16.2* 12.5*   HGB 13.3 13.4 14.1   MCV 94 93 92    278 281    141 136   POTASSIUM 4.2 4.2 4.2   CHLORIDE 110* 110* 103   CO2 31 29 26   BUN 9 9 13   CR 0.93 0.88 0.92   ANIONGAP 2* 2* 7   TISH 8.2* 8.1* 8.7   * 104* 130*   ALBUMIN  --   --  3.1*   PROTTOTAL  --   --  6.8   BILITOTAL  --   --  0.4   ALKPHOS  --   --  58   ALT  --   --  23   AST  --   --  15   LIPASE  --   --  91     No results found for this or any previous visit (from the past 24 hour(s)).  Medications     sodium chloride 75 mL/hr at 08/12/19 2350       meropenem  1 g Intravenous Q8H

## 2019-08-13 NOTE — PLAN OF CARE
DATE & TIME: 8/13/19 0700- 1930  Cognitive Concerns/ Orientation : A&Ox4   BEHAVIOR & AGGRESSION TOOL COLOR: Green  CIWA SCORE: NA  ABNL VS/O2: VSS on RA , afebrile T max 99.2  MOBILITY: Ind  PAIN MANAGMENT: c/o f righr side abdominal discomfort,denied pain meds  DIET: Regular diet, tolerating well, appetite improving  BOWEL/BLADDER: Continent  ABNL LAB/BG: WBC  14.1  DRAIN/DEVICES: PIV infusing  TELEMETRY RHYTHM: NA  SKIN: WNL  TESTS/PROCEDURES: NA  D/C DAY/GOALS/PLACE: Possible discharge 2-3 days, pending improvement  OTHER IMPORTANT INFO:  Infrequent non-productive cough. Continues on IV abx. Urology following., pending  sensitivities on urine cultures, ambulated outside of the room .

## 2019-08-13 NOTE — PLAN OF CARE
Cognitive Concerns/ Orientation : A&Ox4   BEHAVIOR & AGGRESSION TOOL COLOR: Green  CIWA SCORE: NA  ABNL VS/O2: VSS on RA except Temp max 99.4, down to 98.2 after Advilx1  MOBILITY: Ind  PAIN MANAGMENT: denies pain  DIET: Regular diet, tolerating well, appetite improving  BOWEL/BLADDER: Continent  ABNL LAB/BG: WBC 16.2; Lactic acid recheck (per family request) =1.1  DRAIN/DEVICES: PIV infusing  TELEMETRY RHYTHM: NA  SKIN: WNL  TESTS/PROCEDURES: NA  D/C DAY/GOALS/PLACE: Possible discharge 2-3 days, pending improvement  OTHER IMPORTANT INFO: He reported having chills, shaking and diaphoretic. Infrequent non-productive cough. Continues on IV abx. Urology following.

## 2019-08-14 VITALS
OXYGEN SATURATION: 95 % | SYSTOLIC BLOOD PRESSURE: 127 MMHG | HEART RATE: 65 BPM | BODY MASS INDEX: 28.67 KG/M2 | RESPIRATION RATE: 16 BRPM | DIASTOLIC BLOOD PRESSURE: 75 MMHG | TEMPERATURE: 98.2 F | WEIGHT: 193.56 LBS | HEIGHT: 69 IN

## 2019-08-14 LAB
ANION GAP SERPL CALCULATED.3IONS-SCNC: 5 MMOL/L (ref 3–14)
BUN SERPL-MCNC: 10 MG/DL (ref 7–30)
CALCIUM SERPL-MCNC: 8.5 MG/DL (ref 8.5–10.1)
CHLORIDE SERPL-SCNC: 109 MMOL/L (ref 94–109)
CO2 SERPL-SCNC: 30 MMOL/L (ref 20–32)
CREAT SERPL-MCNC: 0.77 MG/DL (ref 0.66–1.25)
ERYTHROCYTE [DISTWIDTH] IN BLOOD BY AUTOMATED COUNT: 12.3 % (ref 10–15)
GFR SERPL CREATININE-BSD FRML MDRD: >90 ML/MIN/{1.73_M2}
GLUCOSE SERPL-MCNC: 97 MG/DL (ref 70–99)
HCT VFR BLD AUTO: 41.1 % (ref 40–53)
HGB BLD-MCNC: 14 G/DL (ref 13.3–17.7)
MCH RBC QN AUTO: 32 PG (ref 26.5–33)
MCHC RBC AUTO-ENTMCNC: 34.1 G/DL (ref 31.5–36.5)
MCV RBC AUTO: 94 FL (ref 78–100)
PLATELET # BLD AUTO: 393 10E9/L (ref 150–450)
POTASSIUM SERPL-SCNC: 4.2 MMOL/L (ref 3.4–5.3)
RBC # BLD AUTO: 4.37 10E12/L (ref 4.4–5.9)
SODIUM SERPL-SCNC: 144 MMOL/L (ref 133–144)
WBC # BLD AUTO: 10.8 10E9/L (ref 4–11)

## 2019-08-14 PROCEDURE — 85027 COMPLETE CBC AUTOMATED: CPT | Performed by: HOSPITALIST

## 2019-08-14 PROCEDURE — 25000128 H RX IP 250 OP 636: Performed by: PHYSICIAN ASSISTANT

## 2019-08-14 PROCEDURE — 99239 HOSP IP/OBS DSCHRG MGMT >30: CPT | Performed by: HOSPITALIST

## 2019-08-14 PROCEDURE — 80048 BASIC METABOLIC PNL TOTAL CA: CPT | Performed by: HOSPITALIST

## 2019-08-14 PROCEDURE — 36415 COLL VENOUS BLD VENIPUNCTURE: CPT | Performed by: HOSPITALIST

## 2019-08-14 RX ORDER — SULFAMETHOXAZOLE/TRIMETHOPRIM 800-160 MG
1 TABLET ORAL 2 TIMES DAILY
Status: DISCONTINUED | OUTPATIENT
Start: 2019-08-14 | End: 2019-08-14 | Stop reason: HOSPADM

## 2019-08-14 RX ORDER — SULFAMETHOXAZOLE/TRIMETHOPRIM 800-160 MG
1 TABLET ORAL 2 TIMES DAILY
Qty: 13 TABLET | Refills: 0 | Status: SHIPPED | OUTPATIENT
Start: 2019-08-14 | End: 2019-11-12

## 2019-08-14 RX ADMIN — MEROPENEM 1 G: 1 INJECTION, POWDER, FOR SOLUTION INTRAVENOUS at 05:53

## 2019-08-14 ASSESSMENT — ACTIVITIES OF DAILY LIVING (ADL)
ADLS_ACUITY_SCORE: 10

## 2019-08-14 NOTE — PLAN OF CARE
Pt is alert and oriented.IVF running 75ml/hr.Tolerating diet,Independent,Pending discharge.Will monitor.

## 2019-08-14 NOTE — PLAN OF CARE
Discharge    Patient discharged to Home with spouse  Care plan note  Cognitive Concerns/ Orientation : A&Ox4   BEHAVIOR & AGGRESSION TOOL COLOR: Green  CIWA SCORE: NA  ABNL VS/O2: VSS on RA  MOBILITY: Ind  PAIN MANAGMENT: denies pain  DIET: Regular diet, tolerating well, appetite improving  BOWEL/BLADDER: Continent  ABNL LAB/BG: WBC 10.8 (14.1 on 8/13)  DRAIN/DEVICES: PIV access removed  TELEMETRY RHYTHM: NA  SKIN: WNL  TESTS/PROCEDURES: NA  D/C DAY/GOALS/PLACE: discharged home today @ 1030  OTHER IMPORTANT INFO: Denies abd pain n/v. Tolerating diet. IV Merrem discontinued, switched to oral Bactrim BID. Went through AVS, pt/spouse verbalized understanding. Belongings packed and sent with the pt. Left the floor @ 1030 with spouse.     Listed belongings gathered and returned to patient. Yes  Care Plan and Patient education resolved: Yes  Prescriptions if needed, hard copies sent with patient  NA  Home and hospital acquired medications returned to patient: NA  Medication Bin checked and emptied on discharge Yes  Follow up appointment made for patient: Yes

## 2019-08-14 NOTE — DISCHARGE SUMMARY
[facility]    Discharge Summary  Hospitalist    Date of Admission:  8/11/2019  Date of Discharge:  8/14/2019  Discharging Provider: Eron Gr MD  Date of Service (when I saw the patient): 08/14/19    Discharge Diagnoses    Acute right pyelonephritis  Sepsis secondary to pyelonephritis  Acute infectious encephalopathy  Recent sinusitis    History of Present Illness   Mr. Calvin Lopez is a 38-year-old male patient with history including history of sinusitis and history of urinary tract infection, who presented 8/11 with fevers with right-sided abdominal pain and found with evidence of right-sided pyelonephritis.      Hospital Course   Calvin Lopez was admitted on 8/11/2019. The following problems were addressed during his hospitalization:    Right pyelonephritis with sepsis, possibly related to kidney stone  Developed urinary urgency and then on the evening of 8/10, developed right-sided abdominal/flank pain and shaking chills. He presented to [facility] on 8/11 and initial evaluation revealed temperature up to 102.7 with tachycardia; white blood cells were elevated but lactic acid was normal. Urinalysis was abnormal with 175 white blood cells, moderate bacteria and white blood cell clumps with positive leukocyte esterase and trace blood. RUQ US 8/11 showed probable gallbladder polyps with no shadowing gallstones or bile duct dilatation, also noted probable nonobstructing stone, left renal collecting system, without hydronephrosis (but not confirmed on CT). CT scan 8/11 showed mild stranding about the right renal pelvis, possibly related to urinary tract infection or recently passed stone, without evidence of current urinary tract calculi or hydronephrosis. The patient was started on ceftriaxone 8/11. Fever to 104.8 evening 8/11. Given persistent fevers and sepsis, ceftriaxone changed to meropenem 8/11 and transitioned from observation to inpatient.  - Urine culture grew 10,000 - 50,000 cfu/ml  "of E.coli.  - Blood cultures negative to date.  - Fever curve much improved.   - Symptoms much improved.  - Leukocytosis resolved.  - Will stop meropenem and start oral bactrim, plan to continue for another 7 days for treatment of complicated UTI.  - He was seen by Urology while in the hospital. Recommend outpatient follow-up with Urology for further evaluation of recurrent UTI and kidney stones.  - Follow-up with PCP in one week.  - Discussed reasons to seek medical attention prior to follow-up appointments.     Acute infectious encephalopathy  Pt felt \"delirious\" with high fevers 8/11.  - Resolved with treatment of the underlying infection.     Recent sinusitis  * The patient initially was suffering from sinusitis and was started on azithromycin on 8/5, but without improvement, and the antibiotics were changed to doxycycline on 8/7 with improvement in sinus symptoms.  * Continued on doxycycline on admit 8/11, discontinued 8/12/19 and was started on meropenem as noted above for UTI.  - Completed course of antibiotics on 8/13/19 and is currently feeling better.    Eron Gr MD    Significant Results and Procedures   CT scan as noted below.    Pending Results   These results will be followed up by PCP  Unresulted Labs Ordered in the Past 30 Days of this Admission     Date and Time Order Name Status Description    8/11/2019 0802 Blood culture Preliminary     8/11/2019 0802 Blood culture Preliminary         Code Status   Full Code       Primary Care Physician   Hiram Corea    Physical Exam   Temp: 98.2  F (36.8  C) Temp src: Oral BP: 127/75 Pulse: 65 Heart Rate: 79 Resp: 16 SpO2: 95 % O2 Device: None (Room air)    Vitals:    08/11/19 0741 08/11/19 2305   Weight: 86.2 kg (190 lb) 87.8 kg (193 lb 9 oz)     Vital Signs with Ranges  Temp:  [98.2  F (36.8  C)-99.2  F (37.3  C)] 98.2  F (36.8  C)  Pulse:  [65-69] 65  Heart Rate:  [79] 79  Resp:  [16] 16  BP: (119-127)/(71-82) 127/75  SpO2:  [95 %-97 %] 95 " %  I/O last 3 completed shifts:  In: 4355 [P.O.:2310; I.V.:2045]  Out: 3500 [Urine:3500]    Constitutional: awake, alert, cooperative, no apparent distress, and appears stated age  Respiratory: clear to auscultation bilaterally, no crackles or wheezing  Cardiovascular: regular rate and rhythm, normal S1 and S2, and no murmur noted  GI: normal bowel sounds, soft, non-distended, minimal RUQ tenderness  Skin: normal skin color, texture, turgor  Neurologic: awake, alert, oriented to name, place and time    Discharge Disposition   Discharged to home  Condition at discharge: Stable    Consultations This Hospital Stay   UROLOGY IP CONSULT    Time Spent on this Encounter   IEron, personally saw the patient today and spent greater than 30 minutes discharging this patient.    Discharge Orders      Follow Up ([facility])    Follow-up in urology office for further recurrent UTI evaluation and possible 24hr urine studies given family hx of hypercalciuria.     [address] [place] [address]. [id number]  You may call [phone] with any questions or concerns.   Central Appointment #: [phone]     Reason for your hospital stay    You were hospitalized for a urinary tract infection.     Follow-up and recommended labs and tests     Follow up with primary care provider, Dr. Hiram Corea, within 7 days for hospital follow- up.  No follow up labs or test are needed.     Activity    Your activity upon discharge: activity as tolerated     Full Code     Diet    Follow this diet upon discharge: Regular     Discharge Medications   Current Discharge Medication List      START taking these medications    Details   sulfamethoxazole-trimethoprim (BACTRIM DS/SEPTRA DS) 800-160 MG tablet Take 1 tablet by mouth 2 times daily for 13 doses  Qty: 13 tablet, Refills: 0    Associated Diagnoses: Pyelonephritis, acute         STOP taking these medications       doxycycline hyclate (VIBRAMYCIN) 100 MG capsule Comments:   Reason  for Stopping:             Allergies   Allergies   Allergen Reactions     Penicillins Hives     Data   Most Recent 3 CBC's:  Recent Labs   Lab Test 08/14/19  0728 08/13/19  0742 08/12/19  0824   WBC 10.8 14.1* 16.2*   HGB 14.0 13.3 13.4   MCV 94 94 93    326 278      Most Recent 3 BMP's:  Recent Labs   Lab Test 08/14/19  0728 08/13/19  0742 08/12/19  0824    143 141   POTASSIUM 4.2 4.2 4.2   CHLORIDE 109 110* 110*   CO2 30 31 29   BUN 10 9 9   CR 0.77 0.93 0.88   ANIONGAP 5 2* 2*   TISH 8.5 8.2* 8.1*   GLC 97 103* 104*     Most Recent 2 LFT's:  Recent Labs   Lab Test 08/11/19  0811   AST 15   ALT 23   ALKPHOS 58   BILITOTAL 0.4     Most Recent 6 Bacteria Isolates From Any Culture (See EPIC Reports for Culture Details):  Recent Labs   Lab Test 08/11/19  0927 08/11/19  0818 08/11/19  0809   CULT 10,000 to 50,000 colonies/mL  Escherichia coli  * No growth after 3 days No growth after 3 days     Most Recent TSH, T4 and A1c Labs:No lab results found.  Results for orders placed or performed during the hospital encounter of 08/11/19   US Abdomen Limited    Narrative    ULTRASOUND ABDOMEN LIMITED 8/11/2019 9:05 AM     HISTORY: Right upper quadrant pain, evaluate hepatic/biliary.      FINDINGS:  Liver is normal in echogenicity without focal lesions. The  gallbladder contains a few nonmobile, nonshadowing echogenic foci  possibly polyps measuring approximately 3 mm in size. No associated  gallbladder wall thickening. Common bile duct is normal in diameter.  Pancreas is normal where visualized. Examination of the right kidney  shows a calcified focus in the upper pole possibly a stone measuring  0.8 cm. No associated hydronephrosis.      Impression    IMPRESSION:  Probable gallbladder polyps. No shadowing gallstones or  bile duct dilatation. Probable nonobstructing stone left renal  collecting system without hydronephrosis.    MADI SHEA MD   CT Abdomen Pelvis w/o Contrast    Narrative    CT ABDOMEN AND PELVIS  WITHOUT CONTRAST 8/11/2019 11:13 AM     HISTORY: Acute right flank pain, pyuria, fever, strong family history  history of kidney stones.     TECHNIQUE: Axial images are obtained from the lung bases to the  symphysis without oral or IV contrast.  Coronal reformatted images are  also generated.  Radiation dose for this scan was reduced using  automated exposure control, adjustment of the mA and/or kV according  to patient size, or iterative reconstruction technique.    FINDINGS:  Atelectasis is present at the lung bases. Lung bases are  otherwise clear.    Abdomen: Respiratory motion artifact is present through the abdomen.  No evidence of hydronephrosis or urinary tract calculi at this time.  No bladder calculi are appreciated. Question whether patient has  recently passed a stone as there is mild stranding about the right  renal pelvis. This could also be related to underlying UTI. Upper  abdominal organs are otherwise within normal limits allowing for the  noncontrast technique including the liver, spleen, gallbladder,  pancreas, adrenal glands and kidneys. No enlarged abdominal lymph  nodes. The bowel is normal in caliber without obstruction,  diverticulitis or appendicitis. Possible mild colonic constipation.    Pelvis: The bladder, prostate gland and rectum are unremarkable. No  enlarged pelvic lymph nodes or free fluid. Bone window examination is  within normal limits.      Impression    IMPRESSION:  1. Mild stranding about the right renal pelvis possibly related to UTI  or recently passed stone. No evidence of current urinary tract calculi  or hydronephrosis.  2. Mild colonic constipation. No bowel obstruction, diverticulitis or  appendicitis.    MADI SHEA MD

## 2019-08-17 LAB
BACTERIA SPEC CULT: NO GROWTH
BACTERIA SPEC CULT: NO GROWTH
Lab: NORMAL
Lab: NORMAL
SPECIMEN SOURCE: NORMAL
SPECIMEN SOURCE: NORMAL

## 2019-10-03 ENCOUNTER — HEALTH MAINTENANCE LETTER (OUTPATIENT)
Age: 39
End: 2019-10-03

## 2019-11-12 ENCOUNTER — HOSPITAL ENCOUNTER (EMERGENCY)
Facility: CLINIC | Age: 39
Discharge: HOME | End: 2019-11-12
Attending: EMERGENCY MEDICINE | Admitting: EMERGENCY MEDICINE
Payer: COMMERCIAL

## 2019-11-12 ENCOUNTER — APPOINTMENT (OUTPATIENT)
Dept: GENERAL RADIOLOGY | Facility: CLINIC | Age: 39
End: 2019-11-12
Attending: EMERGENCY MEDICINE
Payer: COMMERCIAL

## 2019-11-12 ENCOUNTER — APPOINTMENT (OUTPATIENT)
Dept: CT IMAGING | Facility: CLINIC | Age: 39
End: 2019-11-12
Attending: EMERGENCY MEDICINE
Payer: COMMERCIAL

## 2019-11-12 VITALS
RESPIRATION RATE: 16 BRPM | HEIGHT: 70 IN | DIASTOLIC BLOOD PRESSURE: 65 MMHG | BODY MASS INDEX: 27.92 KG/M2 | TEMPERATURE: 99.1 F | SYSTOLIC BLOOD PRESSURE: 126 MMHG | HEART RATE: 79 BPM | WEIGHT: 195 LBS | OXYGEN SATURATION: 95 %

## 2019-11-12 DIAGNOSIS — J10.1 INFLUENZA A: ICD-10-CM

## 2019-11-12 DIAGNOSIS — R10.9 STOMACH ACHE: ICD-10-CM

## 2019-11-12 LAB
ALBUMIN SERPL-MCNC: 3.8 G/DL (ref 3.4–5)
ALBUMIN UR-MCNC: NEGATIVE MG/DL
ALP SERPL-CCNC: 56 U/L (ref 40–150)
ALT SERPL W P-5'-P-CCNC: 23 U/L (ref 0–70)
ANION GAP SERPL CALCULATED.3IONS-SCNC: 6 MMOL/L (ref 3–14)
APPEARANCE UR: CLEAR
AST SERPL W P-5'-P-CCNC: 18 U/L (ref 0–45)
BASOPHILS # BLD AUTO: 0.1 10E9/L (ref 0–0.2)
BASOPHILS NFR BLD AUTO: 0.5 %
BILIRUB SERPL-MCNC: 0.4 MG/DL (ref 0.2–1.3)
BILIRUB UR QL STRIP: NEGATIVE
BUN SERPL-MCNC: 16 MG/DL (ref 7–30)
CALCIUM SERPL-MCNC: 8.6 MG/DL (ref 8.5–10.1)
CHLORIDE SERPL-SCNC: 104 MMOL/L (ref 94–109)
CO2 SERPL-SCNC: 26 MMOL/L (ref 20–32)
COLOR UR AUTO: ABNORMAL
CREAT SERPL-MCNC: 0.81 MG/DL (ref 0.66–1.25)
DIFFERENTIAL METHOD BLD: ABNORMAL
EOSINOPHIL # BLD AUTO: 0 10E9/L (ref 0–0.7)
EOSINOPHIL NFR BLD AUTO: 0.4 %
ERYTHROCYTE [DISTWIDTH] IN BLOOD BY AUTOMATED COUNT: 12.8 % (ref 10–15)
FLUAV+FLUBV AG SPEC QL: NEGATIVE
FLUAV+FLUBV AG SPEC QL: POSITIVE
GFR SERPL CREATININE-BSD FRML MDRD: >90 ML/MIN/{1.73_M2}
GLUCOSE SERPL-MCNC: 97 MG/DL (ref 70–99)
GLUCOSE UR STRIP-MCNC: NEGATIVE MG/DL
HCT VFR BLD AUTO: 44 % (ref 40–53)
HGB BLD-MCNC: 14.6 G/DL (ref 13.3–17.7)
HGB UR QL STRIP: NEGATIVE
IMM GRANULOCYTES # BLD: 0 10E9/L (ref 0–0.4)
IMM GRANULOCYTES NFR BLD: 0.2 %
KETONES UR STRIP-MCNC: 5 MG/DL
LACTATE BLD-SCNC: 0.7 MMOL/L (ref 0.7–2)
LEUKOCYTE ESTERASE UR QL STRIP: NEGATIVE
LIPASE SERPL-CCNC: 122 U/L (ref 73–393)
LYMPHOCYTES # BLD AUTO: 0.8 10E9/L (ref 0.8–5.3)
LYMPHOCYTES NFR BLD AUTO: 8 %
MCH RBC QN AUTO: 31.4 PG (ref 26.5–33)
MCHC RBC AUTO-ENTMCNC: 33.2 G/DL (ref 31.5–36.5)
MCV RBC AUTO: 95 FL (ref 78–100)
MONOCYTES # BLD AUTO: 0.6 10E9/L (ref 0–1.3)
MONOCYTES NFR BLD AUTO: 6.5 %
NEUTROPHILS # BLD AUTO: 8.4 10E9/L (ref 1.6–8.3)
NEUTROPHILS NFR BLD AUTO: 84.4 %
NITRATE UR QL: NEGATIVE
NRBC # BLD AUTO: 0 10*3/UL
NRBC BLD AUTO-RTO: 0 /100
PH UR STRIP: 5.5 PH (ref 5–7)
PLATELET # BLD AUTO: 216 10E9/L (ref 150–450)
POTASSIUM SERPL-SCNC: 3.9 MMOL/L (ref 3.4–5.3)
PROT SERPL-MCNC: 6.8 G/DL (ref 6.8–8.8)
RBC # BLD AUTO: 4.65 10E12/L (ref 4.4–5.9)
RBC #/AREA URNS AUTO: <1 /HPF (ref 0–2)
SODIUM SERPL-SCNC: 136 MMOL/L (ref 133–144)
SOURCE: ABNORMAL
SP GR UR STRIP: 1.01 (ref 1–1.03)
SPECIMEN SOURCE: ABNORMAL
UROBILINOGEN UR STRIP-MCNC: NORMAL MG/DL (ref 0–2)
WBC # BLD AUTO: 9.9 10E9/L (ref 4–11)
WBC #/AREA URNS AUTO: <1 /HPF (ref 0–5)

## 2019-11-12 PROCEDURE — 96360 HYDRATION IV INFUSION INIT: CPT

## 2019-11-12 PROCEDURE — 25000132 ZZH RX MED GY IP 250 OP 250 PS 637: Performed by: EMERGENCY MEDICINE

## 2019-11-12 PROCEDURE — 83605 ASSAY OF LACTIC ACID: CPT | Performed by: EMERGENCY MEDICINE

## 2019-11-12 PROCEDURE — 80053 COMPREHEN METABOLIC PANEL: CPT | Performed by: EMERGENCY MEDICINE

## 2019-11-12 PROCEDURE — 87804 INFLUENZA ASSAY W/OPTIC: CPT | Mod: 59 | Performed by: EMERGENCY MEDICINE

## 2019-11-12 PROCEDURE — 83690 ASSAY OF LIPASE: CPT | Performed by: EMERGENCY MEDICINE

## 2019-11-12 PROCEDURE — 99284 EMERGENCY DEPT VISIT MOD MDM: CPT | Mod: 25

## 2019-11-12 PROCEDURE — 25800030 ZZH RX IP 258 OP 636: Performed by: EMERGENCY MEDICINE

## 2019-11-12 PROCEDURE — 96361 HYDRATE IV INFUSION ADD-ON: CPT

## 2019-11-12 PROCEDURE — 74176 CT ABD & PELVIS W/O CONTRAST: CPT

## 2019-11-12 PROCEDURE — 81001 URINALYSIS AUTO W/SCOPE: CPT | Performed by: EMERGENCY MEDICINE

## 2019-11-12 PROCEDURE — 99284 EMERGENCY DEPT VISIT MOD MDM: CPT | Mod: Z6 | Performed by: EMERGENCY MEDICINE

## 2019-11-12 PROCEDURE — 87040 BLOOD CULTURE FOR BACTERIA: CPT | Performed by: EMERGENCY MEDICINE

## 2019-11-12 PROCEDURE — 85025 COMPLETE CBC W/AUTO DIFF WBC: CPT | Performed by: EMERGENCY MEDICINE

## 2019-11-12 PROCEDURE — 71046 X-RAY EXAM CHEST 2 VIEWS: CPT

## 2019-11-12 RX ORDER — LORATADINE 10 MG/1
10 TABLET ORAL DAILY
COMMUNITY

## 2019-11-12 RX ORDER — ACETAMINOPHEN 500 MG
1000 TABLET ORAL ONCE
Status: COMPLETED | OUTPATIENT
Start: 2019-11-12 | End: 2019-11-12

## 2019-11-12 RX ORDER — OSELTAMIVIR PHOSPHATE 75 MG/1
75 CAPSULE ORAL 2 TIMES DAILY
Qty: 10 CAPSULE | Refills: 0 | Status: SHIPPED | OUTPATIENT
Start: 2019-11-12 | End: 2019-11-17

## 2019-11-12 RX ORDER — SODIUM CHLORIDE 9 MG/ML
1000 INJECTION, SOLUTION INTRAVENOUS CONTINUOUS
Status: DISCONTINUED | OUTPATIENT
Start: 2019-11-12 | End: 2019-11-12 | Stop reason: HOSPADM

## 2019-11-12 RX ADMIN — SODIUM CHLORIDE 1000 ML: 9 INJECTION, SOLUTION INTRAVENOUS at 02:57

## 2019-11-12 RX ADMIN — ACETAMINOPHEN 1000 MG: 500 TABLET, FILM COATED ORAL at 03:01

## 2019-11-12 ASSESSMENT — ENCOUNTER SYMPTOMS
NECK STIFFNESS: 0
ABDOMINAL PAIN: 1
NECK PAIN: 0
SHORTNESS OF BREATH: 0
WOUND: 0
CHEST TIGHTNESS: 0
DIARRHEA: 0
FEVER: 1
HEADACHES: 0
NAUSEA: 0
SINUS PAIN: 1
VOMITING: 0
SORE THROAT: 0
FLANK PAIN: 1
DYSURIA: 0
CHILLS: 0

## 2019-11-12 ASSESSMENT — MIFFLIN-ST. JEOR: SCORE: 1792.82

## 2019-11-12 NOTE — DISCHARGE INSTRUCTIONS
Please make an appointment to follow up with Your Primary Care Provider in 2-3 days if you have any concerns.    Return to the emergency department if you develop worsening fevers, chills, inability to tolerate fluids or foods, or if you have any further concerns    If Calvin has discomfort from fever or other pain, he can have:  Acetaminophen (Tylenol) every 6 hours as needed. His dose is:    2 regular strength tabs (650 mg)                                     (43.2+ kg/96+ lb)    NOTE: If your acetaminophen (Tylenol) came with a dropper marked with 0.4 and 0.8 ml, call us ([phone]) or check with your doctor about the dose before using it.     AND/OR      Ibuprofen (Advil, Motrin) every 6 hours as needed. His/her dose is:    2 regular strength tabs (400 mg)                                                                         (40-60 kg/ lb)

## 2019-11-12 NOTE — ED AVS SNAPSHOT
[facility], [place], Emergency Department  [address] [place] [id number]  Phone:  [phone]                                    Calvin Lopez   MRN: [id number]    Department:  [facility], Emergency Department   Date of Visit:  11/12/2019           After Visit Summary Signature Page    I have received my discharge instructions, and my questions have been answered. I have discussed any challenges I see with this plan with the nurse or doctor.    ..........................................................................................................................................  Patient/Patient Representative Signature      ..........................................................................................................................................  Patient Representative Print Name and Relationship to Patient    ..................................................               ................................................  Date                                   Time    ..........................................................................................................................................  Reviewed by Signature/Title    ...................................................              ..............................................  Date                                               Time          22EPIC Rev 08/18

## 2019-11-12 NOTE — ED PROVIDER NOTES
"  History     Chief Complaint   Patient presents with     Fever     HPI  Calvin Lopez is a 39 year old male who has a past medical history of hypercalciuria, pyelonephritis presenting with right flank pain and fevers.  Reportedly had a fever of 103.8 at home.  He is not taking Tylenol ibuprofen.  Denies nausea, vomiting or chills.  Denies chest pain or shortness of breath.  No cough, trouble swallowing or sore throat.  He has been admitted for this before when there is some concern for infected stone.  He says that his symptoms today feel similar.    I have reviewed the Medications, Allergies, Past Medical and Surgical History, and Social History in the Epic system.    Review of Systems   Constitutional: Positive for fever. Negative for chills.   HENT: Positive for sinus pain. Negative for sore throat.    Respiratory: Negative for chest tightness and shortness of breath.    Gastrointestinal: Positive for abdominal pain. Negative for diarrhea, nausea and vomiting.   Genitourinary: Positive for flank pain. Negative for dysuria.   Musculoskeletal: Negative for neck pain and neck stiffness.   Skin: Negative for rash and wound.   Neurological: Negative for headaches.   All other systems reviewed and are negative.      Physical Exam   BP: 135/69  Heart Rate: 93  Temp: 99.1  F (37.3  C)  Resp: 16  Height: 176.5 cm (5' 9.5\")  Weight: 88.5 kg (195 lb)  SpO2: 97 %      Physical Exam  Physical Exam   Constitutional: oriented to person, place, and time. appears well-developed and well-nourished.   HENT:   Head: Normocephalic and atraumatic.   Neck: Normal range of motion. No nuchal rigidity.  Pulmonary/Chest: Effort normal. No respiratory distress.   Cardiac: No murmurs, rubs, gallops. RRR.  Abdominal: Right CVA tenderness.  Abdomen soft, no suprapubic tenderness or other abdominal tenderness., nondistended. No rebound tenderness.  MSK: Long bones without deformity or evidence of trauma  Neurological: alert and oriented to " person, place, and time.   Skin: Skin is warm and dry.   Psychiatric:  normal mood and affect.  behavior is normal. Thought content normal.     ED Course        Procedures        Results for orders placed or performed during the hospital encounter of 11/12/19   CBC with platelets differential     Status: Abnormal   Result Value Ref Range    WBC 9.9 4.0 - 11.0 10e9/L    RBC Count 4.65 4.4 - 5.9 10e12/L    Hemoglobin 14.6 13.3 - 17.7 g/dL    Hematocrit 44.0 40.0 - 53.0 %    MCV 95 78 - 100 fl    MCH 31.4 26.5 - 33.0 pg    MCHC 33.2 31.5 - 36.5 g/dL    RDW 12.8 10.0 - 15.0 %    Platelet Count 216 150 - 450 10e9/L    Diff Method Automated Method     % Neutrophils 84.4 %    % Lymphocytes 8.0 %    % Monocytes 6.5 %    % Eosinophils 0.4 %    % Basophils 0.5 %    % Immature Granulocytes 0.2 %    Nucleated RBCs 0 0 /100    Absolute Neutrophil 8.4 (H) 1.6 - 8.3 10e9/L    Absolute Lymphocytes 0.8 0.8 - 5.3 10e9/L    Absolute Monocytes 0.6 0.0 - 1.3 10e9/L    Absolute Eosinophils 0.0 0.0 - 0.7 10e9/L    Absolute Basophils 0.1 0.0 - 0.2 10e9/L    Abs Immature Granulocytes 0.0 0 - 0.4 10e9/L    Absolute Nucleated RBC 0.0    Comprehensive metabolic panel     Status: None   Result Value Ref Range    Sodium 136 133 - 144 mmol/L    Potassium 3.9 3.4 - 5.3 mmol/L    Chloride 104 94 - 109 mmol/L    Carbon Dioxide 26 20 - 32 mmol/L    Anion Gap 6 3 - 14 mmol/L    Glucose 97 70 - 99 mg/dL    Urea Nitrogen 16 7 - 30 mg/dL    Creatinine 0.81 0.66 - 1.25 mg/dL    GFR Estimate >90 >60 mL/min/[1.73_m2]    GFR Estimate If Black >90 >60 mL/min/[1.73_m2]    Calcium 8.6 8.5 - 10.1 mg/dL    Bilirubin Total 0.4 0.2 - 1.3 mg/dL    Albumin 3.8 3.4 - 5.0 g/dL    Protein Total 6.8 6.8 - 8.8 g/dL    Alkaline Phosphatase 56 40 - 150 U/L    ALT 23 0 - 70 U/L    AST 18 0 - 45 U/L   Lactic acid whole blood     Status: None   Result Value Ref Range    Lactic Acid 0.7 0.7 - 2.0 mmol/L   Lipase     Status: None   Result Value Ref Range    Lipase 122 73 - 393  U/L   UA reflex to Microscopic and Culture     Status: Abnormal   Result Value Ref Range    Color Urine Light Yellow     Appearance Urine Clear     Glucose Urine Negative NEG^Negative mg/dL    Bilirubin Urine Negative NEG^Negative    Ketones Urine 5 (A) NEG^Negative mg/dL    Specific Gravity Urine 1.015 1.003 - 1.035    Blood Urine Negative NEG^Negative    pH Urine 5.5 5.0 - 7.0 pH    Protein Albumin Urine Negative NEG^Negative mg/dL    Urobilinogen mg/dL Normal 0.0 - 2.0 mg/dL    Nitrite Urine Negative NEG^Negative    Leukocyte Esterase Urine Negative NEG^Negative    Source Clean catch urine     RBC Urine <1 0 - 2 /HPF    WBC Urine <1 0 - 5 /HPF   Blood culture     Status: None (Preliminary result)   Result Value Ref Range    Specimen Description Blood Right Arm     Special Requests Aerobic and anaerobic bottles received     Culture Micro No growth after 2 hours    Influenza A/B antigen     Status: Abnormal   Result Value Ref Range    Influenza A/B Agn Specimen Nasopharyngeal     Influenza A Positive (A) NEG^Negative    Influenza B Negative NEG^Negative         Labs Ordered and Resulted from Time of ED Arrival Up to the Time of Departure from the ED   LACTIC ACID WHOLE BLOOD   CBC WITH PLATELETS DIFFERENTIAL   COMPREHENSIVE METABOLIC PANEL   LIPASE   UA MACROSCOPIC WITH REFLEX TO MICRO AND CULTURE   BLOOD CULTURE   BLOOD CULTURE            Assessments & Plan (with Medical Decision Making)   MDM  Patient is presenting with body aches, fever at home.  Work-up shows that he is influenza A positive.  Otherwise vitals are stable here.  No fever here.  No signs of urine infection or pneumonia.  CT done as he has had complicated pyelonephritis in the past, this is unremarkable.  Patient will be discharged.  Had a risk benefit discussion about Tamiflu, the patient elected to take Tamiflu, prescription was provided.  He is given return precautions.  Patient will follow-up with his primary care provider if he has any  concerns.    I have reviewed the nursing notes.    I have reviewed the findings, diagnosis, plan and need for follow up with the patient.    New Prescriptions    No medications on file       Final diagnoses:   Influenza A       11/12/2019   [facility], [place], EMERGENCY DEPARTMENT     George Gutiérrez MD  11/12/19 0530

## 2019-11-12 NOTE — ED TRIAGE NOTES
Pt. Presents to ED with complaints of fever of 103.8, R flank pain, sinus pain/pressure, and some dysuria/darker urine. Pt. Reports he has had this before and was septic. Pt. Afebrile in triage, AVSS on RA.

## 2019-11-18 LAB
BACTERIA SPEC CULT: NO GROWTH
Lab: NORMAL
SPECIMEN SOURCE: NORMAL

## 2020-11-07 ENCOUNTER — HEALTH MAINTENANCE LETTER (OUTPATIENT)
Age: 40
End: 2020-11-07

## 2021-09-05 ENCOUNTER — HEALTH MAINTENANCE LETTER (OUTPATIENT)
Age: 41
End: 2021-09-05

## 2021-12-26 ENCOUNTER — HEALTH MAINTENANCE LETTER (OUTPATIENT)
Age: 41
End: 2021-12-26

## 2022-10-23 ENCOUNTER — HEALTH MAINTENANCE LETTER (OUTPATIENT)
Age: 42
End: 2022-10-23

## 2023-04-02 ENCOUNTER — HEALTH MAINTENANCE LETTER (OUTPATIENT)
Age: 43
End: 2023-04-02

## 2025-01-26 ENCOUNTER — HEALTH MAINTENANCE LETTER (OUTPATIENT)
Age: 45
End: 2025-01-26

## 2025-02-03 ENCOUNTER — OFFICE VISIT (OUTPATIENT)
Dept: FAMILY MEDICINE | Facility: CLINIC | Age: 45
End: 2025-02-03
Payer: COMMERCIAL

## 2025-02-03 VITALS
RESPIRATION RATE: 16 BRPM | HEART RATE: 42 BPM | DIASTOLIC BLOOD PRESSURE: 66 MMHG | HEIGHT: 69 IN | OXYGEN SATURATION: 97 % | TEMPERATURE: 97.7 F | WEIGHT: 217 LBS | BODY MASS INDEX: 32.14 KG/M2 | SYSTOLIC BLOOD PRESSURE: 132 MMHG

## 2025-02-03 DIAGNOSIS — Z13.228 SCREENING FOR METABOLIC DISORDER: ICD-10-CM

## 2025-02-03 DIAGNOSIS — Z12.11 SCREEN FOR COLON CANCER: ICD-10-CM

## 2025-02-03 DIAGNOSIS — Z00.00 WELLNESS EXAMINATION: Primary | ICD-10-CM

## 2025-02-03 DIAGNOSIS — Z13.220 LIPID SCREENING: ICD-10-CM

## 2025-02-03 DIAGNOSIS — Z86.39 HISTORY OF HYPERCALCEMIA: ICD-10-CM

## 2025-02-03 DIAGNOSIS — Z76.89 ESTABLISHING CARE WITH NEW DOCTOR, ENCOUNTER FOR: ICD-10-CM

## 2025-02-03 PROBLEM — Z87.440 HISTORY OF BLADDER INFECTIONS: Status: ACTIVE | Noted: 2019-08-21

## 2025-02-03 PROCEDURE — 80061 LIPID PANEL: CPT | Mod: ORL | Performed by: FAMILY MEDICINE

## 2025-02-03 PROCEDURE — 80048 BASIC METABOLIC PNL TOTAL CA: CPT | Mod: ORL | Performed by: FAMILY MEDICINE

## 2025-02-03 SDOH — HEALTH STABILITY: PHYSICAL HEALTH: ON AVERAGE, HOW MANY DAYS PER WEEK DO YOU ENGAGE IN MODERATE TO STRENUOUS EXERCISE (LIKE A BRISK WALK)?: 5 DAYS

## 2025-02-03 ASSESSMENT — SOCIAL DETERMINANTS OF HEALTH (SDOH): HOW OFTEN DO YOU GET TOGETHER WITH FRIENDS OR RELATIVES?: THREE TIMES A WEEK
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
## 2025-02-03 NOTE — NURSING NOTE
"44 year old  Chief Complaint   Patient presents with    \[facility]\"" Care     Preventative visit.       Blood pressure 132/66, pulse (!) 42, temperature 97.7  F (36.5  C), temperature source Skin, resp. rate 16, height 1.765 m (5' 9.49\"), weight 98.4 kg (217 lb), SpO2 97%. Body mass index is 31.6 kg/m .  Patient Active Problem List   Diagnosis    Pyelonephritis       Wt Readings from Last 2 Encounters:   02/03/25 98.4 kg (217 lb)   11/12/19 88.5 kg (195 lb)     BP Readings from Last 3 Encounters:   02/03/25 132/66   11/12/19 126/65   08/14/19 127/75         Current Outpatient Medications   Medication Sig Dispense Refill    loratadine (CLARITIN) 10 MG tablet Take 10 mg by mouth daily (Patient not taking: Reported on 2/3/2025)       No current facility-administered medications for this visit.       Social History     Tobacco Use    Smoking status: Never    Smokeless tobacco: Never   Substance Use Topics    Alcohol use: Yes     Alcohol/week: 5.0 standard drinks of alcohol     Types: 5 Standard drinks or equivalent per week    Drug use: No       Health Maintenance Due   Topic Date Due    ADVANCE CARE PLANNING  Never done    COLORECTAL CANCER SCREENING  Never done    HIV SCREENING  Never done    HEPATITIS C SCREENING  Never done    YEARLY PREVENTIVE VISIT  04/21/2018    LIPID  04/21/2022    GLUCOSE  11/12/2022    COVID-19 Vaccine (5 - 2024-25 season) 09/01/2024       No results found for: \"PAP\"      February 3, 2025 1:47 PM   "

## 2025-02-04 LAB
ANION GAP SERPL CALCULATED.3IONS-SCNC: 11 MMOL/L (ref 7–15)
BUN SERPL-MCNC: 19.8 MG/DL (ref 6–20)
CALCIUM SERPL-MCNC: 9.1 MG/DL (ref 8.8–10.4)
CHLORIDE SERPL-SCNC: 106 MMOL/L (ref 98–107)
CHOLEST SERPL-MCNC: 173 MG/DL
CREAT SERPL-MCNC: 0.88 MG/DL (ref 0.67–1.17)
EGFRCR SERPLBLD CKD-EPI 2021: >90 ML/MIN/1.73M2
FASTING STATUS PATIENT QL REPORTED: NO
FASTING STATUS PATIENT QL REPORTED: NO
GLUCOSE SERPL-MCNC: 92 MG/DL (ref 70–99)
HCO3 SERPL-SCNC: 23 MMOL/L (ref 22–29)
HDLC SERPL-MCNC: 34 MG/DL
LDLC SERPL CALC-MCNC: 113 MG/DL
NONHDLC SERPL-MCNC: 139 MG/DL
POTASSIUM SERPL-SCNC: 4.3 MMOL/L (ref 3.4–5.3)
SODIUM SERPL-SCNC: 140 MMOL/L (ref 135–145)
TRIGL SERPL-MCNC: 128 MG/DL

## 2025-04-23 ENCOUNTER — TELEPHONE (OUTPATIENT)
Dept: GASTROENTEROLOGY | Facility: CLINIC | Age: 45
End: 2025-04-23
Payer: COMMERCIAL

## 2025-04-23 ENCOUNTER — HOSPITAL ENCOUNTER (OUTPATIENT)
Facility: CLINIC | Age: 45
End: 2025-04-23
Attending: COLON & RECTAL SURGERY | Admitting: COLON & RECTAL SURGERY
Payer: COMMERCIAL

## 2025-04-23 NOTE — TELEPHONE ENCOUNTER
"Endoscopy Scheduling Screen    Caller: patient    Have you had any respiratory illness or flu-like symptoms in the last 10 days?  No    What is your communication preference for Instructions and/or Bowel Prep?   MyChart    What insurance is in the chart?  Other:  [facility]    Ordering/Referring Provider:   ALEXA CRUZ    (If ordering provider performs procedure, schedule with ordering provider unless otherwise instructed. )    BMI: Estimated body mass index is 31.6 kg/m  as calculated from the following:    Height as of 2/3/25: 1.765 m (5' 9.49\").    Weight as of 2/3/25: 98.4 kg (217 lb).     Sedation Ordered  moderate sedation.   If patient BMI > 50 do not schedule in ASC.    If patient BMI > 45 do not schedule at [facility].    Are you taking methadone or Suboxone?  NO, No RN review required.    Have you been diagnosed and are being treated for severe PTSD or severe anxiety?  NO, No RN review required.    Are you taking any prescription medications for pain 3 or more times per week?   NO, No RN review required.    Do you have a history of malignant hyperthermia?  No    (Females) Are you currently pregnant?   No     Have you been diagnosed or told you have pulmonary hypertension?   No    Do you have an LVAD?  No    Have you been told you have moderate to severe sleep apnea?  No.    Have you been told you have COPD, asthma, or any other lung disease?  No    Has your doctor ordered any cardiac tests like echo, angiogram, stress test, ablation, or EKG, that you have not completed yet?  No    Do you  have a history of any heart conditions?  No     Have you ever had or are you waiting for an organ transplant?  No. Continue scheduling, no site restrictions.    Have you had a stroke or transient ischemic attack (TIA aka \"mini stroke\") in the last 2 years?   No.    Have you been diagnosed with or been told you have cirrhosis of the liver?   No.    Are you currently on dialysis?   No    Do you need assistance " "transferring?   No    BMI: Estimated body mass index is 31.6 kg/m  as calculated from the following:    Height as of 2/3/25: 1.765 m (5' 9.49\").    Weight as of 2/3/25: 98.4 kg (217 lb).     Is patients BMI > 40 and scheduling location [facility]?  No    Do you take an injectable or oral medication for weight loss or diabetes (excluding insulin)?  No    Do you take the medication Naltrexone?  No    Do you take blood thinners?  No       Prep   Are you currently on dialysis or do you have chronic kidney disease?  No    Do you have a diagnosis of diabetes?  No    Do you have a diagnosis of cystic fibrosis (CF)?  No    On a regular basis do you go 3 -5 days between bowel movements?  No    BMI > 40?  No    Preferred Pharmacy:        [id number] [place] [address] AT [facility] [address]  [facility] [address]  Phone: [phone] Fax: [phone]          Final Scheduling Details     Procedure scheduled  Colonoscopy    Surgeon:  Elza     Date of procedure:  5/7     Pre-OP / PAC:   No - Not required for this site.    Location  [facility] - Patient preference.    Sedation   Moderate Sedation - Per order.      Patient Reminders:   You will receive a call from a Nurse to review instructions and health history.  This assessment must be completed prior to your procedure.  Failure to complete the Nurse assessment may result in the procedure being cancelled.      On the day of your procedure, please designate an adult(s) who can drive you home stay with you for the next 24 hours. The medicines used in the exam will make you sleepy. You will not be able to drive.      You cannot take public transportation, ride share services, or non-medical taxi service without a responsible caregiver.  Medical transport services are allowed with the requirement that a responsible caregiver will receive you at your destination.  We require that drivers and caregivers are confirmed prior to your " procedure.

## 2025-04-29 ENCOUNTER — TELEPHONE (OUTPATIENT)
Dept: GASTROENTEROLOGY | Facility: CLINIC | Age: 45
End: 2025-04-29
Payer: COMMERCIAL

## 2025-04-29 NOTE — TELEPHONE ENCOUNTER
Caller: Writer to Calvin - Patient    Reason for Reschedule/Cancellation (please be detailed, any staff messages or encounters to note?):   Pre Assessment not completed    Did you cancel or rescheduled an E[facility] procedure? No.    Is screening questionnaire older than 3 months from the reschedule date.   If Yes, please complete screening questionnaire. No    Prior to reschedule please review:  Ordering Provider: Darryn Mondragon  Sedation Determined: Moderate  Does patient have any ASC Exclusions, please identify?: No    Notes on Cancelled Procedure:  Procedure: Lower Endoscopy [Colonoscopy]   Date: 5/7/25  Location: [facility]; Formerly named [facility] [address] [place] [address]   Surgeon: Elza    Rescheduled: No, LVM & MYC sent - CASE IN DEPOT